# Patient Record
Sex: MALE | NOT HISPANIC OR LATINO | Employment: OTHER | URBAN - METROPOLITAN AREA
[De-identification: names, ages, dates, MRNs, and addresses within clinical notes are randomized per-mention and may not be internally consistent; named-entity substitution may affect disease eponyms.]

---

## 2023-09-26 PROBLEM — R73.9 HYPERGLYCEMIA: Status: ACTIVE | Noted: 2023-09-26

## 2023-09-26 PROBLEM — I25.5 ISCHEMIC CARDIOMYOPATHY: Status: ACTIVE | Noted: 2023-09-26

## 2023-09-26 PROBLEM — I48.19 OTHER PERSISTENT ATRIAL FIBRILLATION (MULTI): Status: ACTIVE | Noted: 2023-09-26

## 2023-09-26 PROBLEM — I48.91 ATRIAL FIBRILLATION (MULTI): Status: ACTIVE | Noted: 2023-09-26

## 2023-09-26 PROBLEM — E78.5 HYPERLIPIDEMIA: Status: ACTIVE | Noted: 2023-09-26

## 2023-09-26 PROBLEM — F17.211 NICOTINE DEPENDENCE, CIGARETTES, IN REMISSION: Status: ACTIVE | Noted: 2023-09-26

## 2023-09-26 PROBLEM — R73.03 PRE-DIABETES: Status: ACTIVE | Noted: 2023-09-26

## 2023-09-26 PROBLEM — E66.812 CLASS 2 OBESITY: Status: ACTIVE | Noted: 2023-09-26

## 2023-09-26 PROBLEM — E55.9 VITAMIN D DEFICIENCY: Status: ACTIVE | Noted: 2023-09-26

## 2023-09-26 PROBLEM — R97.20 ELEVATED PSA: Status: ACTIVE | Noted: 2023-09-26

## 2023-09-26 PROBLEM — G47.33 OSA (OBSTRUCTIVE SLEEP APNEA): Status: ACTIVE | Noted: 2023-09-26

## 2023-09-26 PROBLEM — Z95.1 S/P CABG X 3: Status: ACTIVE | Noted: 2023-09-26

## 2023-09-26 PROBLEM — F17.200 TOBACCO USE DISORDER: Status: ACTIVE | Noted: 2023-09-26

## 2023-09-26 PROBLEM — Z95.5 HISTORY OF CORONARY ARTERY STENT PLACEMENT: Status: ACTIVE | Noted: 2023-09-26

## 2023-09-26 PROBLEM — I48.0 PAROXYSMAL ATRIAL FIBRILLATION (MULTI): Status: ACTIVE | Noted: 2023-09-26

## 2023-09-26 PROBLEM — G47.10 HYPERSOMNIA: Status: ACTIVE | Noted: 2023-09-26

## 2023-09-26 PROBLEM — E66.9 OBESITY: Status: ACTIVE | Noted: 2023-09-26

## 2023-09-26 PROBLEM — I47.29 NSVT (NONSUSTAINED VENTRICULAR TACHYCARDIA) (MULTI): Status: ACTIVE | Noted: 2023-09-26

## 2023-09-26 PROBLEM — R40.0 SOMNOLENCE: Status: ACTIVE | Noted: 2023-09-26

## 2023-09-26 PROBLEM — E66.9 CLASS 2 OBESITY: Status: ACTIVE | Noted: 2023-09-26

## 2023-09-26 PROBLEM — I25.10 CAD (CORONARY ARTERY DISEASE): Status: ACTIVE | Noted: 2023-09-26

## 2023-09-26 PROBLEM — H54.7: Status: ACTIVE | Noted: 2023-09-26

## 2023-09-26 PROBLEM — E78.2 MIXED HYPERLIPIDEMIA: Status: ACTIVE | Noted: 2023-09-26

## 2023-09-26 PROBLEM — I10 BENIGN HYPERTENSION: Status: ACTIVE | Noted: 2023-09-26

## 2023-09-26 RX ORDER — ASPIRIN 81 MG/1
1 TABLET ORAL DAILY
COMMUNITY

## 2023-09-26 RX ORDER — METOPROLOL TARTRATE 25 MG/1
1 TABLET, FILM COATED ORAL 2 TIMES DAILY
COMMUNITY

## 2023-09-26 RX ORDER — PANTOPRAZOLE SODIUM 40 MG/1
1 TABLET, DELAYED RELEASE ORAL DAILY
COMMUNITY
End: 2023-10-09 | Stop reason: ALTCHOICE

## 2023-09-26 RX ORDER — CHOLECALCIFEROL (VITAMIN D3) 25 MCG
1 TABLET ORAL DAILY
COMMUNITY

## 2023-09-26 RX ORDER — MULTIVITAMIN
1 TABLET ORAL DAILY
COMMUNITY
End: 2023-10-09 | Stop reason: ALTCHOICE

## 2023-09-26 RX ORDER — POLYETHYLENE GLYCOL 3350 17 G/17G
17 POWDER, FOR SOLUTION ORAL DAILY PRN
COMMUNITY
Start: 2020-10-22 | End: 2023-10-09 | Stop reason: ALTCHOICE

## 2023-09-26 RX ORDER — DOCUSATE SODIUM 100 MG/1
CAPSULE, LIQUID FILLED ORAL 2 TIMES DAILY PRN
COMMUNITY
Start: 2020-10-22 | End: 2023-10-09 | Stop reason: ALTCHOICE

## 2023-09-26 RX ORDER — AMIODARONE HYDROCHLORIDE 200 MG/1
1 TABLET ORAL DAILY
COMMUNITY
End: 2023-10-09 | Stop reason: ALTCHOICE

## 2023-09-26 RX ORDER — ATORVASTATIN CALCIUM 80 MG/1
1 TABLET, FILM COATED ORAL NIGHTLY
COMMUNITY
End: 2024-04-16

## 2023-09-26 RX ORDER — LISINOPRIL AND HYDROCHLOROTHIAZIDE 10; 12.5 MG/1; MG/1
1 TABLET ORAL DAILY
COMMUNITY

## 2023-09-26 RX ORDER — ACETAMINOPHEN 325 MG/1
650 TABLET ORAL EVERY 6 HOURS PRN
COMMUNITY
Start: 2020-10-22 | End: 2024-04-11 | Stop reason: ALTCHOICE

## 2023-09-26 RX ORDER — CLOPIDOGREL BISULFATE 75 MG/1
75 TABLET ORAL DAILY
COMMUNITY
Start: 2020-10-22 | End: 2023-10-09 | Stop reason: ALTCHOICE

## 2023-10-04 PROBLEM — K21.9 GASTROESOPHAGEAL REFLUX DISEASE WITHOUT ESOPHAGITIS: Status: ACTIVE | Noted: 2023-10-04

## 2023-10-04 NOTE — PROGRESS NOTES
Subjective   Patient ID:   Ramsey Hebert is a 73 y.o. male who presents for No chief complaint on file..  HPI  A-fib/HTN/CAD/NSVT:  Seeing cardiology.  Taking Amiodarone, Plavix, Lisinopril-hydrochlorothiazide, Metoprolol.  BP today is  Denies dizziness, headaches.    HLD:  Taking Atorvastatin.  Last checked April 2022.  DUE for labs.    Pre-diabetes:  Last A1C was 6.4 in April 2022.  Not on medication for this.  DUE for labs.    Elevated PSA:  DUE for labs.    GERD:  Taking Protonix.    Health maintenance:  Smoking:  PSA (50+): DUE  Labs: DUE  Colonoscopy (50-75): DUE  Prevnar 13 (65+):  Pneumovax 23 (65+, 1 year after Prev 13):  Influenza:  Zostavax (60+, 1 time, at pharmacy):    Review of Systems  12 point review of systems negative unless stated above in HPI    There were no vitals filed for this visit.    Physical Exam  General: Alert and oriented, well nourished, no acute distress.  Lungs: Clear to auscultation, non-labored respiration.  Heart: Normal rate, regular rhythm, no murmur, gallop or edema.  Neurologic: Awake, alert, and oriented X3, CN II-XII intact.  Psychiatric: Cooperative, appropriate mood and affect.    Assessment/Plan   Diagnoses and all orders for this visit:  Medicare annual wellness visit, subsequent  Depression screening  Atrial fibrillation, unspecified type (CMS/HCC)  Benign hypertension  Coronary artery disease involving native heart without angina pectoris, unspecified vessel or lesion type  Pure hypercholesterolemia  Paroxysmal atrial fibrillation (CMS/HCC)  Other persistent atrial fibrillation (CMS/HCC)  Pre-diabetes  NSVT (nonsustained ventricular tachycardia) (CMS/HCC)  Gastroesophageal reflux disease without esophagitis  Elevated PSA

## 2023-10-09 ENCOUNTER — OFFICE VISIT (OUTPATIENT)
Dept: PRIMARY CARE | Facility: CLINIC | Age: 73
End: 2023-10-09
Payer: COMMERCIAL

## 2023-10-09 VITALS
BODY MASS INDEX: 44.1 KG/M2 | HEART RATE: 56 BPM | WEIGHT: 281 LBS | HEIGHT: 67 IN | DIASTOLIC BLOOD PRESSURE: 72 MMHG | OXYGEN SATURATION: 98 % | SYSTOLIC BLOOD PRESSURE: 146 MMHG

## 2023-10-09 DIAGNOSIS — I48.0 PAROXYSMAL ATRIAL FIBRILLATION (MULTI): ICD-10-CM

## 2023-10-09 DIAGNOSIS — Z13.220 ENCOUNTER FOR LIPID SCREENING FOR CARDIOVASCULAR DISEASE: ICD-10-CM

## 2023-10-09 DIAGNOSIS — E78.00 PURE HYPERCHOLESTEROLEMIA: ICD-10-CM

## 2023-10-09 DIAGNOSIS — R97.20 ELEVATED PSA: ICD-10-CM

## 2023-10-09 DIAGNOSIS — I25.10 CORONARY ARTERY DISEASE INVOLVING NATIVE HEART WITHOUT ANGINA PECTORIS, UNSPECIFIED VESSEL OR LESION TYPE: ICD-10-CM

## 2023-10-09 DIAGNOSIS — Z00.00 MEDICARE ANNUAL WELLNESS VISIT, SUBSEQUENT: Primary | ICD-10-CM

## 2023-10-09 DIAGNOSIS — Z00.00 ROUTINE GENERAL MEDICAL EXAMINATION AT HEALTH CARE FACILITY: ICD-10-CM

## 2023-10-09 DIAGNOSIS — K21.9 GASTROESOPHAGEAL REFLUX DISEASE WITHOUT ESOPHAGITIS: ICD-10-CM

## 2023-10-09 DIAGNOSIS — Z13.31 DEPRESSION SCREENING: ICD-10-CM

## 2023-10-09 DIAGNOSIS — I10 BENIGN HYPERTENSION: ICD-10-CM

## 2023-10-09 DIAGNOSIS — I48.19 OTHER PERSISTENT ATRIAL FIBRILLATION (MULTI): ICD-10-CM

## 2023-10-09 DIAGNOSIS — I48.91 ATRIAL FIBRILLATION, UNSPECIFIED TYPE (MULTI): ICD-10-CM

## 2023-10-09 DIAGNOSIS — I47.29 NSVT (NONSUSTAINED VENTRICULAR TACHYCARDIA) (MULTI): ICD-10-CM

## 2023-10-09 DIAGNOSIS — M79.642 PAIN IN BOTH HANDS: ICD-10-CM

## 2023-10-09 DIAGNOSIS — M79.641 PAIN IN BOTH HANDS: ICD-10-CM

## 2023-10-09 DIAGNOSIS — R73.03 PRE-DIABETES: ICD-10-CM

## 2023-10-09 DIAGNOSIS — E66.01 OBESITY, MORBID (MULTI): ICD-10-CM

## 2023-10-09 DIAGNOSIS — Z13.6 ENCOUNTER FOR LIPID SCREENING FOR CARDIOVASCULAR DISEASE: ICD-10-CM

## 2023-10-09 PROCEDURE — 3078F DIAST BP <80 MM HG: CPT | Performed by: PHYSICIAN ASSISTANT

## 2023-10-09 PROCEDURE — 1159F MED LIST DOCD IN RCRD: CPT | Performed by: PHYSICIAN ASSISTANT

## 2023-10-09 PROCEDURE — 3077F SYST BP >= 140 MM HG: CPT | Performed by: PHYSICIAN ASSISTANT

## 2023-10-09 PROCEDURE — 99213 OFFICE O/P EST LOW 20 MIN: CPT | Performed by: PHYSICIAN ASSISTANT

## 2023-10-09 PROCEDURE — 4004F PT TOBACCO SCREEN RCVD TLK: CPT | Performed by: PHYSICIAN ASSISTANT

## 2023-10-09 PROCEDURE — 1170F FXNL STATUS ASSESSED: CPT | Performed by: PHYSICIAN ASSISTANT

## 2023-10-09 PROCEDURE — G0439 PPPS, SUBSEQ VISIT: HCPCS | Performed by: PHYSICIAN ASSISTANT

## 2023-10-09 PROCEDURE — G0444 DEPRESSION SCREEN ANNUAL: HCPCS | Performed by: PHYSICIAN ASSISTANT

## 2023-10-09 PROCEDURE — 1126F AMNT PAIN NOTED NONE PRSNT: CPT | Performed by: PHYSICIAN ASSISTANT

## 2023-10-09 PROCEDURE — 1160F RVW MEDS BY RX/DR IN RCRD: CPT | Performed by: PHYSICIAN ASSISTANT

## 2023-10-09 RX ORDER — LISINOPRIL 10 MG/1
10 TABLET ORAL DAILY
COMMUNITY
Start: 2016-02-07 | End: 2023-10-09 | Stop reason: ALTCHOICE

## 2023-10-09 RX ORDER — CHLORTHALIDONE 25 MG/1
1 TABLET ORAL DAILY
COMMUNITY
Start: 2015-05-13 | End: 2023-10-09 | Stop reason: ALTCHOICE

## 2023-10-09 RX ORDER — DICLOFENAC SODIUM 10 MG/G
4 GEL TOPICAL 3 TIMES DAILY PRN
Qty: 100 G | Refills: 1 | Status: SHIPPED | OUTPATIENT
Start: 2023-10-09

## 2023-10-09 ASSESSMENT — ACTIVITIES OF DAILY LIVING (ADL)
GROCERY_SHOPPING: INDEPENDENT
TAKING_MEDICATION: INDEPENDENT
MANAGING_FINANCES: INDEPENDENT
DRESSING: INDEPENDENT
DOING_HOUSEWORK: INDEPENDENT
BATHING: INDEPENDENT

## 2023-10-09 ASSESSMENT — PATIENT HEALTH QUESTIONNAIRE - PHQ9
2. FEELING DOWN, DEPRESSED OR HOPELESS: NOT AT ALL
SUM OF ALL RESPONSES TO PHQ9 QUESTIONS 1 AND 2: 0
1. LITTLE INTEREST OR PLEASURE IN DOING THINGS: NOT AT ALL

## 2023-10-09 ASSESSMENT — LIFESTYLE VARIABLES
HOW OFTEN DO YOU HAVE SIX OR MORE DRINKS ON ONE OCCASION: NEVER
HOW OFTEN DO YOU HAVE A DRINK CONTAINING ALCOHOL: NEVER
SKIP TO QUESTIONS 9-10: 1
HOW MANY STANDARD DRINKS CONTAINING ALCOHOL DO YOU HAVE ON A TYPICAL DAY: PATIENT DOES NOT DRINK
AUDIT-C TOTAL SCORE: 0

## 2023-10-09 ASSESSMENT — ENCOUNTER SYMPTOMS
LOSS OF SENSATION IN FEET: 0
OCCASIONAL FEELINGS OF UNSTEADINESS: 0
DEPRESSION: 0

## 2023-10-09 ASSESSMENT — PAIN SCALES - GENERAL: PAINLEVEL: 0-NO PAIN

## 2023-10-09 NOTE — PROGRESS NOTES
"Subjective   Reason for Visit: Ramsey Hebert is an 73 y.o. male here for a Medicare Wellness visit.     Past Medical, Surgical, and Family History reviewed and updated in chart.    Reviewed all medications by prescribing practitioner or clinical pharmacist (such as prescriptions, OTCs, herbal therapies and supplements) and documented in the medical record.    [facility]    Patient Care Team:  Pradip Alvarado PA-C as PCP - General (Family Medicine)     A-fib/HTN/CAD/NSVT:  Seeing cardiology.  Taking Amiodarone, Plavix, Lisinopril-hydrochlorothiazide, Metoprolol.  BP today is 146/72.  Denies dizziness, headaches.    HLD:  Taking Atorvastatin.  Last checked April 2022.  DUE for labs.    Pre-diabetes:  Last A1C was 6.4 in April 2022.  Not on medication for this.  DUE for labs.    Elevated PSA:  DUE for labs.    GERD:  Had been on Protonix in the past.  Doing well.    Hand pain:  Both hands.  Worse in his right hand.  Worse with activity.    Health maintenance:  Smoking: Current smoker.  PSA (50+): DUE  Labs: DUE  Colonoscopy (50-75): DUE  Prevnar 13 (65+):  Pneumovax 23 (65+, 1 year after Prev 13):  Influenza:  Zostavax (60+, 1 time, at pharmacy):    Review of Systems  12 point review of systems negative unless stated above in HPI    Objective   Vitals:  /72   Pulse 56   Ht 1.702 m (5' 7\")   Wt 127 kg (281 lb)   SpO2 98%   BMI 44.01 kg/m²       Physical Exam  General: Alert and oriented, well nourished, no acute distress.  Lungs: Clear to auscultation, non-labored respiration.  Heart: Normal rate, regular rhythm, no murmur, gallop or edema.  Neurologic: Awake, alert, and oriented X3, CN II-XII intact.  Psychiatric: Cooperative, appropriate mood and affect.    Assessment/Plan   This counts as your annual Medicare Wellness visit.  Health maintenance was reviewed today.  Depression screening is negative.  I have ordered some labs to be done as soon as you can.  We will call you with the results.  I have sent in " Voltaren gel for the hand pains.  Consider ortho.  Continue specialty care.  Continue the same medications.  Chronic conditions are stable.  Call with questions or concerns.    Follow up  6 months  Problem List Items Addressed This Visit          Cardiac and Vasculature    CAD (coronary artery disease)    Benign hypertension    Hyperlipidemia    NSVT (nonsustained ventricular tachycardia) (CMS/[facility])    Atrial fibrillation (CMS/[facility])    Relevant Orders    Comprehensive Metabolic Panel    Lipid Panel    CBC and Auto Differential    Other persistent atrial fibrillation (CMS/HCC)    Paroxysmal atrial fibrillation (CMS/[facility])       Endocrine/Metabolic    Obesity, morbid (CMS/[facility])    Pre-diabetes    Relevant Orders    Hemoglobin A1C       Gastrointestinal and Abdominal    Gastroesophageal reflux disease without esophagitis       Genitourinary and Reproductive    Elevated PSA    Relevant Orders    Prostate Specific Antigen, Screen       Musculoskeletal and Injuries    Pain in both hands    Relevant Medications    diclofenac sodium (Voltaren) 1 % gel gel     Other Visit Diagnoses       Medicare annual wellness visit, subsequent    -  Primary    Depression screening        Encounter for lipid screening for cardiovascular disease        Relevant Orders    Lipid Panel    Routine general medical examination at health care facility

## 2024-04-01 NOTE — PROGRESS NOTES
Subjective   Reason for Visit: Ramsey Hebert is an 73 y.o. male here for a follow up    HPI    Patient Care Team:  rPadip Alvarado PA-C as PCP - General (Internal Medicine)     A-fib/HTN/CAD/NSVT:  Seeing cardiology.  Taking Amiodarone, Plavix, Lisinopril-hydrochlorothiazide, Metoprolol.  BP today is 124/82.  Denies dizziness, headaches.    HLD:  Taking Atorvastatin.  Last checked April 2024.    Diabetes:  New onset in April 2024 with A1C of 7.2.  I recommended Metformin.    Elevated PSA:  Last checked April 2024 and was improving.    GERD:  Had been on Protonix in the past.  Doing well.    Hand pain:  I gave Voltaren gel last visit.  Consider ortho.  Both hands.  Worse in his right hand.  Worse with activity.    Leg cramping:  Happening off and on.  Both legs.  Only while walking.  Does not take any magnesium supplements.    Health maintenance:  Smoking: Current smoker.  PSA (50+): April 2024.  Labs: April 2024.  Prevnar 13 (65+):  Pneumovax 23 (65+, 1 year after Prev 13):  Influenza:  Zostavax (60+, 1 time, at pharmacy):    Review of Systems  12 point review of systems negative unless stated above in HPI    Objective   Vitals:  /82   Pulse 67   Wt 123 kg (270 lb 9.6 oz)   SpO2 97%   BMI 42.38 kg/m²       Physical Exam  General: Alert and oriented, well nourished, no acute distress.  Lungs: Clear to auscultation, non-labored respiration.  Heart: Normal rate, regular rhythm, no murmur, gallop or edema.  Neurologic: Awake, alert, and oriented X3, CN II-XII intact.  Psychiatric: Cooperative, appropriate mood and affect.    Assessment/Plan   It was good seeing you!  We discussed your recent lab results today.  I have sent in Metformin to begin for diabetes.  I would also recommend an over the counter magnesium supplement for the leg cramping.  Consider a muscle relaxer.  Continue specialty care.  Continue the same medications.  Chronic conditions are stable.  Call with questions or concerns.    Follow up  3  months for A1C  Problem List Items Addressed This Visit          Cardiac and Vasculature    CAD (coronary artery disease)    HTN (hypertension)    Hyperlipidemia    NSVT (nonsustained ventricular tachycardia) (CMS/[facility])    Current Assessment & Plan     Stable - seeing cardiology         Atrial fibrillation (CMS/HCC) - Primary    Current Assessment & Plan     Stable - seeing cardiology         Other persistent atrial fibrillation (CMS/HCC)    Current Assessment & Plan     Stable - seeing cardiology         Paroxysmal atrial fibrillation (CMS/HCC)    Current Assessment & Plan     Stable - seeing cardiology            Endocrine/Metabolic    Morbid obesity (CMS/HCC)    Current Assessment & Plan     Working on diet and exercise         Type 2 diabetes mellitus without complication, without long-term current use of insulin (CMS/HCC)    Current Assessment & Plan     New onset in April 2024. Started Metformin at that time.         Relevant Medications    metFORMIN (Glucophage) 500 mg tablet       Gastrointestinal and Abdominal    Gastroesophageal reflux disease without esophagitis       Musculoskeletal and Injuries    Pain in both hands       Symptoms and Signs    Leg cramping     Other Visit Diagnoses       Screening PSA (prostate specific antigen)

## 2024-04-04 ENCOUNTER — LAB (OUTPATIENT)
Dept: LAB | Facility: LAB | Age: 74
End: 2024-04-04
Payer: MEDICARE

## 2024-04-04 DIAGNOSIS — R73.03 PRE-DIABETES: ICD-10-CM

## 2024-04-04 DIAGNOSIS — E11.9 TYPE 2 DIABETES MELLITUS WITHOUT COMPLICATION, WITHOUT LONG-TERM CURRENT USE OF INSULIN (MULTI): Primary | ICD-10-CM

## 2024-04-04 DIAGNOSIS — R97.20 ELEVATED PSA: ICD-10-CM

## 2024-04-04 DIAGNOSIS — I48.91 ATRIAL FIBRILLATION, UNSPECIFIED TYPE (MULTI): ICD-10-CM

## 2024-04-04 DIAGNOSIS — Z13.6 ENCOUNTER FOR LIPID SCREENING FOR CARDIOVASCULAR DISEASE: ICD-10-CM

## 2024-04-04 DIAGNOSIS — Z13.220 ENCOUNTER FOR LIPID SCREENING FOR CARDIOVASCULAR DISEASE: ICD-10-CM

## 2024-04-04 LAB
ALBUMIN SERPL-MCNC: 3.9 G/DL (ref 3.5–5)
ALP BLD-CCNC: 93 U/L (ref 35–125)
ALT SERPL-CCNC: 20 U/L (ref 5–40)
ANION GAP SERPL CALC-SCNC: 14 MMOL/L
AST SERPL-CCNC: 15 U/L (ref 5–40)
BASOPHILS # BLD AUTO: 0.05 X10*3/UL (ref 0–0.1)
BASOPHILS NFR BLD AUTO: 0.6 %
BILIRUB SERPL-MCNC: 0.7 MG/DL (ref 0.1–1.2)
BUN SERPL-MCNC: 26 MG/DL (ref 8–25)
CALCIUM SERPL-MCNC: 9.2 MG/DL (ref 8.5–10.4)
CHLORIDE SERPL-SCNC: 105 MMOL/L (ref 97–107)
CHOLEST SERPL-MCNC: 127 MG/DL (ref 133–200)
CHOLEST/HDLC SERPL: 5.8 {RATIO}
CO2 SERPL-SCNC: 22 MMOL/L (ref 24–31)
CREAT SERPL-MCNC: 1.5 MG/DL (ref 0.4–1.6)
EGFRCR SERPLBLD CKD-EPI 2021: 49 ML/MIN/1.73M*2
EOSINOPHIL # BLD AUTO: 0.24 X10*3/UL (ref 0–0.4)
EOSINOPHIL NFR BLD AUTO: 2.8 %
ERYTHROCYTE [DISTWIDTH] IN BLOOD BY AUTOMATED COUNT: 14.4 % (ref 11.5–14.5)
EST. AVERAGE GLUCOSE BLD GHB EST-MCNC: 160 MG/DL
GLUCOSE SERPL-MCNC: 142 MG/DL (ref 65–99)
HBA1C MFR BLD: 7.2 %
HCT VFR BLD AUTO: 44.2 % (ref 41–52)
HDLC SERPL-MCNC: 22 MG/DL
HGB BLD-MCNC: 14.2 G/DL (ref 13.5–17.5)
IMM GRANULOCYTES # BLD AUTO: 0.04 X10*3/UL (ref 0–0.5)
IMM GRANULOCYTES NFR BLD AUTO: 0.5 % (ref 0–0.9)
LDLC SERPL CALC-MCNC: 61 MG/DL (ref 65–130)
LYMPHOCYTES # BLD AUTO: 2.25 X10*3/UL (ref 0.8–3)
LYMPHOCYTES NFR BLD AUTO: 26.7 %
MCH RBC QN AUTO: 30.7 PG (ref 26–34)
MCHC RBC AUTO-ENTMCNC: 32.1 G/DL (ref 32–36)
MCV RBC AUTO: 96 FL (ref 80–100)
MONOCYTES # BLD AUTO: 0.65 X10*3/UL (ref 0.05–0.8)
MONOCYTES NFR BLD AUTO: 7.7 %
NEUTROPHILS # BLD AUTO: 5.21 X10*3/UL (ref 1.6–5.5)
NEUTROPHILS NFR BLD AUTO: 61.7 %
NRBC BLD-RTO: 0 /100 WBCS (ref 0–0)
PLATELET # BLD AUTO: 188 X10*3/UL (ref 150–450)
POTASSIUM SERPL-SCNC: 4.4 MMOL/L (ref 3.4–5.1)
PROT SERPL-MCNC: 6.4 G/DL (ref 5.9–7.9)
PSA SERPL-MCNC: 4.1 NG/ML
RBC # BLD AUTO: 4.62 X10*6/UL (ref 4.5–5.9)
SODIUM SERPL-SCNC: 141 MMOL/L (ref 133–145)
TRIGL SERPL-MCNC: 218 MG/DL (ref 40–150)
WBC # BLD AUTO: 8.4 X10*3/UL (ref 4.4–11.3)

## 2024-04-04 PROCEDURE — 83036 HEMOGLOBIN GLYCOSYLATED A1C: CPT

## 2024-04-04 PROCEDURE — 36415 COLL VENOUS BLD VENIPUNCTURE: CPT

## 2024-04-04 PROCEDURE — 85025 COMPLETE CBC W/AUTO DIFF WBC: CPT

## 2024-04-04 PROCEDURE — 84153 ASSAY OF PSA TOTAL: CPT

## 2024-04-04 PROCEDURE — 80061 LIPID PANEL: CPT

## 2024-04-04 PROCEDURE — 80053 COMPREHEN METABOLIC PANEL: CPT

## 2024-04-04 NOTE — RESULT ENCOUNTER NOTE
New onset diabetes with A1C of 7.2 - I would like to start Metformin 500mg BID if agreeable. We can discuss this more at his visit next week too. Waiting on PSA

## 2024-04-09 ENCOUNTER — TELEPHONE (OUTPATIENT)
Dept: PRIMARY CARE | Facility: CLINIC | Age: 74
End: 2024-04-09
Payer: MEDICARE

## 2024-04-09 RX ORDER — METFORMIN HYDROCHLORIDE 500 MG/1
500 TABLET ORAL
Qty: 90 TABLET | Refills: 1 | Status: SHIPPED | OUTPATIENT
Start: 2024-04-09 | End: 2024-04-11 | Stop reason: WASHOUT

## 2024-04-10 ENCOUNTER — APPOINTMENT (OUTPATIENT)
Dept: PRIMARY CARE | Facility: CLINIC | Age: 74
End: 2024-04-10
Payer: COMMERCIAL

## 2024-04-11 ENCOUNTER — OFFICE VISIT (OUTPATIENT)
Dept: PRIMARY CARE | Facility: CLINIC | Age: 74
End: 2024-04-11
Payer: MEDICARE

## 2024-04-11 VITALS
OXYGEN SATURATION: 97 % | HEART RATE: 67 BPM | WEIGHT: 270.6 LBS | BODY MASS INDEX: 42.38 KG/M2 | SYSTOLIC BLOOD PRESSURE: 124 MMHG | DIASTOLIC BLOOD PRESSURE: 82 MMHG

## 2024-04-11 DIAGNOSIS — I48.91 ATRIAL FIBRILLATION, UNSPECIFIED TYPE (MULTI): Primary | ICD-10-CM

## 2024-04-11 DIAGNOSIS — I48.0 PAROXYSMAL ATRIAL FIBRILLATION (MULTI): ICD-10-CM

## 2024-04-11 DIAGNOSIS — R25.2 LEG CRAMPING: ICD-10-CM

## 2024-04-11 DIAGNOSIS — I10 BENIGN HYPERTENSION: ICD-10-CM

## 2024-04-11 DIAGNOSIS — K21.9 GASTROESOPHAGEAL REFLUX DISEASE WITHOUT ESOPHAGITIS: ICD-10-CM

## 2024-04-11 DIAGNOSIS — E66.01 OBESITY, MORBID (MULTI): ICD-10-CM

## 2024-04-11 DIAGNOSIS — I48.19 OTHER PERSISTENT ATRIAL FIBRILLATION (MULTI): ICD-10-CM

## 2024-04-11 DIAGNOSIS — I25.10 CORONARY ARTERY DISEASE INVOLVING NATIVE HEART WITHOUT ANGINA PECTORIS, UNSPECIFIED VESSEL OR LESION TYPE: ICD-10-CM

## 2024-04-11 DIAGNOSIS — Z12.5 SCREENING PSA (PROSTATE SPECIFIC ANTIGEN): ICD-10-CM

## 2024-04-11 DIAGNOSIS — I47.29 NSVT (NONSUSTAINED VENTRICULAR TACHYCARDIA) (MULTI): ICD-10-CM

## 2024-04-11 DIAGNOSIS — E78.00 PURE HYPERCHOLESTEROLEMIA: ICD-10-CM

## 2024-04-11 DIAGNOSIS — E11.9 TYPE 2 DIABETES MELLITUS WITHOUT COMPLICATION, WITHOUT LONG-TERM CURRENT USE OF INSULIN (MULTI): ICD-10-CM

## 2024-04-11 DIAGNOSIS — M79.642 PAIN IN BOTH HANDS: ICD-10-CM

## 2024-04-11 DIAGNOSIS — M79.641 PAIN IN BOTH HANDS: ICD-10-CM

## 2024-04-11 PROBLEM — Z78.9 MEDICAL HOME PATIENT: Status: ACTIVE | Noted: 2024-04-11

## 2024-04-11 PROBLEM — M47.26 OSTEOARTHRITIS OF SPINE WITH RADICULOPATHY, LUMBAR REGION: Status: ACTIVE | Noted: 2018-04-13

## 2024-04-11 PROCEDURE — 3079F DIAST BP 80-89 MM HG: CPT | Performed by: PHYSICIAN ASSISTANT

## 2024-04-11 PROCEDURE — 3074F SYST BP LT 130 MM HG: CPT | Performed by: PHYSICIAN ASSISTANT

## 2024-04-11 PROCEDURE — 1157F ADVNC CARE PLAN IN RCRD: CPT | Performed by: PHYSICIAN ASSISTANT

## 2024-04-11 PROCEDURE — 1159F MED LIST DOCD IN RCRD: CPT | Performed by: PHYSICIAN ASSISTANT

## 2024-04-11 PROCEDURE — 3051F HG A1C>EQUAL 7.0%<8.0%: CPT | Performed by: PHYSICIAN ASSISTANT

## 2024-04-11 PROCEDURE — 99214 OFFICE O/P EST MOD 30 MIN: CPT | Performed by: PHYSICIAN ASSISTANT

## 2024-04-11 PROCEDURE — 3048F LDL-C <100 MG/DL: CPT | Performed by: PHYSICIAN ASSISTANT

## 2024-04-11 PROCEDURE — 1126F AMNT PAIN NOTED NONE PRSNT: CPT | Performed by: PHYSICIAN ASSISTANT

## 2024-04-11 PROCEDURE — 1160F RVW MEDS BY RX/DR IN RCRD: CPT | Performed by: PHYSICIAN ASSISTANT

## 2024-04-11 RX ORDER — METFORMIN HYDROCHLORIDE 500 MG/1
500 TABLET ORAL
Qty: 180 TABLET | Refills: 0 | Status: SHIPPED | OUTPATIENT
Start: 2024-04-11 | End: 2024-07-10

## 2024-04-11 ASSESSMENT — LIFESTYLE VARIABLES
SKIP TO QUESTIONS 9-10: 1
HOW OFTEN DO YOU HAVE A DRINK CONTAINING ALCOHOL: NEVER
HOW MANY STANDARD DRINKS CONTAINING ALCOHOL DO YOU HAVE ON A TYPICAL DAY: PATIENT DOES NOT DRINK
HOW OFTEN DO YOU HAVE SIX OR MORE DRINKS ON ONE OCCASION: NEVER
AUDIT-C TOTAL SCORE: 0

## 2024-04-11 ASSESSMENT — ENCOUNTER SYMPTOMS
OCCASIONAL FEELINGS OF UNSTEADINESS: 0
DEPRESSION: 0

## 2024-04-11 ASSESSMENT — PATIENT HEALTH QUESTIONNAIRE - PHQ9
SUM OF ALL RESPONSES TO PHQ9 QUESTIONS 1 AND 2: 0
2. FEELING DOWN, DEPRESSED OR HOPELESS: NOT AT ALL
1. LITTLE INTEREST OR PLEASURE IN DOING THINGS: NOT AT ALL

## 2024-04-11 ASSESSMENT — PAIN SCALES - GENERAL: PAINLEVEL: 0-NO PAIN

## 2024-04-15 DIAGNOSIS — E78.5 HYPERLIPIDEMIA, UNSPECIFIED: ICD-10-CM

## 2024-04-16 RX ORDER — ATORVASTATIN CALCIUM 80 MG/1
80 TABLET, FILM COATED ORAL DAILY
Qty: 90 TABLET | Refills: 2 | Status: SHIPPED | OUTPATIENT
Start: 2024-04-16

## 2024-07-01 NOTE — PROGRESS NOTES
"Subjective   Reason for Visit: Ramsey Hebert is an 73 y.o. male here for a follow up    HPI  Patient Care Team:  Pradip Alvarado PA-C as PCP - General (Internal Medicine)  Zulma Ward MD as PCP - [employer] PCP     A-fib/HTN/CAD/NSVT:  Seeing cardiology.  Taking Amiodarone, Plavix, Lisinopril-hydrochlorothiazide, Metoprolol.  BP today is 126/84.  Denies dizziness, headaches.    Bunion:  Right foot.  Symptoms x years.  Bothersome.  Does not see podiatry.    HLD:  Taking Atorvastatin.  Last checked April 2024.    Diabetes:  New onset in April 2024 with A1C of 7.2.  We started Metformin last visit.  POC today is 6.1.  Checking sugars at home and getting low 100s.    Elevated PSA:  Last checked April 2024 and was improving.    GERD:  Had been on Protonix in the past.  Doing well.    Hand pain:  I gave Voltaren gel last visit - this did not help much.  Consider ortho.  Both hands.  Worse in his right hand.  Worse with activity.    Leg cramping:  I recommended magnesium last visit.  Consider a muscle relaxer.  Happening off and on.  Both legs.  Only while walking.    Health maintenance:  Smoking: Current smoker.  PSA (50+): April 2024.  Labs: April 2024.  Prevnar 13 (65+): 2013  Pneumovax 23 (65+, 1 year after Prev 13): 2016  Influenza:  Zostavax (60+, 1 time, at pharmacy):    Review of Systems  12 point review of systems negative unless stated above in HPI    Objective   Vitals:  /84   Pulse 62   Ht 1.727 m (5' 8\")   Wt 119 kg (262 lb)   SpO2 95%   BMI 39.84 kg/m²       Physical Exam  General: Alert and oriented, well nourished, no acute distress.  Lungs: Clear to auscultation, non-labored respiration.  Heart: Normal rate, regular rhythm, no murmur, gallop or edema.  Neurologic: Awake, alert, and oriented X3, CN II-XII intact.  Psychiatric: Cooperative, appropriate mood and affect.    Assessment/Plan   It was good seeing you!  Your A1C is improving!  I have placed a referral to podiatry for " further management.  Continue working on diet and exercise.  Continue specialty care.  Continue the same medications.  Chronic conditions are stable.  Call with questions or concerns.    Follow up  4 months for A1C  Problem List Items Addressed This Visit          Cardiac and Vasculature    CAD (coronary artery disease)    Hyperlipidemia    NSVT (nonsustained ventricular tachycardia) (Multi)    Atrial fibrillation (Multi)       Endocrine/Metabolic    Type 2 diabetes mellitus without complication, without long-term current use of insulin (Multi) - Primary    Relevant Orders    POCT glycosylated hemoglobin (Hb A1C) manually resulted (Completed)       Gastrointestinal and Abdominal    Gastroesophageal reflux disease without esophagitis       Genitourinary and Reproductive    Elevated PSA       Musculoskeletal and Injuries    Pain in both hands    Bunion of right foot    Relevant Orders    Referral to Podiatry       Symptoms and Signs    Leg cramping     Other Visit Diagnoses       Benign hypertension        Obesity, morbid (Multi)

## 2024-07-09 DIAGNOSIS — E11.9 TYPE 2 DIABETES MELLITUS WITHOUT COMPLICATION, WITHOUT LONG-TERM CURRENT USE OF INSULIN (MULTI): ICD-10-CM

## 2024-07-09 RX ORDER — METFORMIN HYDROCHLORIDE 500 MG/1
500 TABLET ORAL
Qty: 180 TABLET | Refills: 3 | Status: SHIPPED | OUTPATIENT
Start: 2024-07-09 | End: 2025-07-04

## 2024-07-10 ENCOUNTER — OFFICE VISIT (OUTPATIENT)
Dept: PRIMARY CARE | Facility: CLINIC | Age: 74
End: 2024-07-10
Payer: MEDICARE

## 2024-07-10 VITALS
HEART RATE: 62 BPM | DIASTOLIC BLOOD PRESSURE: 84 MMHG | HEIGHT: 68 IN | OXYGEN SATURATION: 95 % | WEIGHT: 262 LBS | SYSTOLIC BLOOD PRESSURE: 126 MMHG | BODY MASS INDEX: 39.71 KG/M2

## 2024-07-10 DIAGNOSIS — E11.9 TYPE 2 DIABETES MELLITUS WITHOUT COMPLICATION, WITHOUT LONG-TERM CURRENT USE OF INSULIN (MULTI): Primary | ICD-10-CM

## 2024-07-10 DIAGNOSIS — I48.91 ATRIAL FIBRILLATION, UNSPECIFIED TYPE (MULTI): ICD-10-CM

## 2024-07-10 DIAGNOSIS — E66.01 OBESITY, MORBID (MULTI): ICD-10-CM

## 2024-07-10 DIAGNOSIS — M79.642 PAIN IN BOTH HANDS: ICD-10-CM

## 2024-07-10 DIAGNOSIS — M79.641 PAIN IN BOTH HANDS: ICD-10-CM

## 2024-07-10 DIAGNOSIS — M21.611 BUNION OF RIGHT FOOT: ICD-10-CM

## 2024-07-10 DIAGNOSIS — I10 BENIGN HYPERTENSION: ICD-10-CM

## 2024-07-10 DIAGNOSIS — I25.10 CORONARY ARTERY DISEASE INVOLVING NATIVE HEART WITHOUT ANGINA PECTORIS, UNSPECIFIED VESSEL OR LESION TYPE: ICD-10-CM

## 2024-07-10 DIAGNOSIS — I47.29 NSVT (NONSUSTAINED VENTRICULAR TACHYCARDIA) (MULTI): ICD-10-CM

## 2024-07-10 DIAGNOSIS — R25.2 LEG CRAMPING: ICD-10-CM

## 2024-07-10 DIAGNOSIS — R97.20 ELEVATED PSA: ICD-10-CM

## 2024-07-10 DIAGNOSIS — E78.00 PURE HYPERCHOLESTEROLEMIA: ICD-10-CM

## 2024-07-10 DIAGNOSIS — K21.9 GASTROESOPHAGEAL REFLUX DISEASE WITHOUT ESOPHAGITIS: ICD-10-CM

## 2024-07-10 LAB — POC HEMOGLOBIN A1C: 6.1 % (ref 4.2–6.5)

## 2024-07-10 PROCEDURE — 99213 OFFICE O/P EST LOW 20 MIN: CPT | Performed by: PHYSICIAN ASSISTANT

## 2024-07-10 PROCEDURE — 1160F RVW MEDS BY RX/DR IN RCRD: CPT | Performed by: PHYSICIAN ASSISTANT

## 2024-07-10 PROCEDURE — 1126F AMNT PAIN NOTED NONE PRSNT: CPT | Performed by: PHYSICIAN ASSISTANT

## 2024-07-10 PROCEDURE — 3051F HG A1C>EQUAL 7.0%<8.0%: CPT | Performed by: PHYSICIAN ASSISTANT

## 2024-07-10 PROCEDURE — 83036 HEMOGLOBIN GLYCOSYLATED A1C: CPT | Mod: QW | Performed by: PHYSICIAN ASSISTANT

## 2024-07-10 PROCEDURE — 1159F MED LIST DOCD IN RCRD: CPT | Performed by: PHYSICIAN ASSISTANT

## 2024-07-10 PROCEDURE — 3048F LDL-C <100 MG/DL: CPT | Performed by: PHYSICIAN ASSISTANT

## 2024-07-10 PROCEDURE — 3079F DIAST BP 80-89 MM HG: CPT | Performed by: PHYSICIAN ASSISTANT

## 2024-07-10 PROCEDURE — 1157F ADVNC CARE PLAN IN RCRD: CPT | Performed by: PHYSICIAN ASSISTANT

## 2024-07-10 PROCEDURE — 3074F SYST BP LT 130 MM HG: CPT | Performed by: PHYSICIAN ASSISTANT

## 2024-07-10 ASSESSMENT — PAIN SCALES - GENERAL: PAINLEVEL: 0-NO PAIN

## 2024-07-10 ASSESSMENT — ENCOUNTER SYMPTOMS
DEPRESSION: 0
OCCASIONAL FEELINGS OF UNSTEADINESS: 0
LOSS OF SENSATION IN FEET: 0

## 2024-07-10 ASSESSMENT — PATIENT HEALTH QUESTIONNAIRE - PHQ9
2. FEELING DOWN, DEPRESSED OR HOPELESS: NOT AT ALL
1. LITTLE INTEREST OR PLEASURE IN DOING THINGS: NOT AT ALL
SUM OF ALL RESPONSES TO PHQ9 QUESTIONS 1 AND 2: 0

## 2024-07-13 ENCOUNTER — APPOINTMENT (OUTPATIENT)
Dept: RADIOLOGY | Facility: HOSPITAL | Age: 74
End: 2024-07-13
Payer: MEDICARE

## 2024-07-13 ENCOUNTER — HOSPITAL ENCOUNTER (EMERGENCY)
Facility: HOSPITAL | Age: 74
Discharge: HOME | End: 2024-07-14
Payer: MEDICARE

## 2024-07-13 ENCOUNTER — HOSPITAL ENCOUNTER (EMERGENCY)
Facility: HOSPITAL | Age: 74
Discharge: OTHER INSTITUTION | End: 2024-07-13
Payer: MEDICARE

## 2024-07-13 VITALS
DIASTOLIC BLOOD PRESSURE: 82 MMHG | BODY MASS INDEX: 39.4 KG/M2 | RESPIRATION RATE: 18 BRPM | HEIGHT: 68 IN | TEMPERATURE: 97.7 F | WEIGHT: 260 LBS | SYSTOLIC BLOOD PRESSURE: 174 MMHG | HEART RATE: 76 BPM | OXYGEN SATURATION: 98 %

## 2024-07-13 DIAGNOSIS — H11.32 CONJUNCTIVAL HEMORRHAGE OF LEFT EYE: ICD-10-CM

## 2024-07-13 DIAGNOSIS — H20.9 TRAUMATIC IRITIS: Primary | ICD-10-CM

## 2024-07-13 DIAGNOSIS — S05.8X2A: Primary | ICD-10-CM

## 2024-07-13 PROCEDURE — 99284 EMERGENCY DEPT VISIT MOD MDM: CPT | Mod: 25

## 2024-07-13 PROCEDURE — 76377 3D RENDER W/INTRP POSTPROCES: CPT | Performed by: RADIOLOGY

## 2024-07-13 PROCEDURE — 99284 EMERGENCY DEPT VISIT MOD MDM: CPT

## 2024-07-13 PROCEDURE — 70486 CT MAXILLOFACIAL W/O DYE: CPT | Performed by: RADIOLOGY

## 2024-07-13 PROCEDURE — 76377 3D RENDER W/INTRP POSTPROCES: CPT

## 2024-07-13 PROCEDURE — 2500000001 HC RX 250 WO HCPCS SELF ADMINISTERED DRUGS (ALT 637 FOR MEDICARE OP): Performed by: PHYSICIAN ASSISTANT

## 2024-07-13 PROCEDURE — 70486 CT MAXILLOFACIAL W/O DYE: CPT

## 2024-07-13 RX ORDER — TETRACAINE HYDROCHLORIDE 5 MG/ML
1 SOLUTION OPHTHALMIC ONCE
Status: COMPLETED | OUTPATIENT
Start: 2024-07-13 | End: 2024-07-13

## 2024-07-13 RX ORDER — ACETAMINOPHEN 325 MG/1
975 TABLET ORAL ONCE
Status: COMPLETED | OUTPATIENT
Start: 2024-07-13 | End: 2024-07-13

## 2024-07-13 RX ADMIN — TETRACAINE HYDROCHLORIDE 1 DROP: 5 SOLUTION OPHTHALMIC at 20:20

## 2024-07-13 RX ADMIN — ACETAMINOPHEN 975 MG: 325 TABLET ORAL at 23:41

## 2024-07-13 RX ADMIN — FLUORESCEIN SODIUM 1 STRIP: 1 STRIP OPHTHALMIC at 20:20

## 2024-07-13 ASSESSMENT — PAIN DESCRIPTION - ORIENTATION
ORIENTATION: LEFT
ORIENTATION: LEFT

## 2024-07-13 ASSESSMENT — PAIN DESCRIPTION - LOCATION
LOCATION: EYE
LOCATION: EYE

## 2024-07-13 ASSESSMENT — COLUMBIA-SUICIDE SEVERITY RATING SCALE - C-SSRS
2. HAVE YOU ACTUALLY HAD ANY THOUGHTS OF KILLING YOURSELF?: NO
6. HAVE YOU EVER DONE ANYTHING, STARTED TO DO ANYTHING, OR PREPARED TO DO ANYTHING TO END YOUR LIFE?: NO
1. IN THE PAST MONTH, HAVE YOU WISHED YOU WERE DEAD OR WISHED YOU COULD GO TO SLEEP AND NOT WAKE UP?: NO
1. IN THE PAST MONTH, HAVE YOU WISHED YOU WERE DEAD OR WISHED YOU COULD GO TO SLEEP AND NOT WAKE UP?: NO
6. HAVE YOU EVER DONE ANYTHING, STARTED TO DO ANYTHING, OR PREPARED TO DO ANYTHING TO END YOUR LIFE?: NO
2. HAVE YOU ACTUALLY HAD ANY THOUGHTS OF KILLING YOURSELF?: NO

## 2024-07-13 ASSESSMENT — LIFESTYLE VARIABLES
HAVE YOU EVER FELT YOU SHOULD CUT DOWN ON YOUR DRINKING: NO
TOTAL SCORE: 0
HAVE PEOPLE ANNOYED YOU BY CRITICIZING YOUR DRINKING: NO
EVER HAD A DRINK FIRST THING IN THE MORNING TO STEADY YOUR NERVES TO GET RID OF A HANGOVER: NO
EVER FELT BAD OR GUILTY ABOUT YOUR DRINKING: NO

## 2024-07-13 ASSESSMENT — VISUAL ACUITY
OU: 20/25
OD: 20/20

## 2024-07-13 ASSESSMENT — PAIN DESCRIPTION - PAIN TYPE
TYPE: ACUTE PAIN
TYPE: ACUTE PAIN

## 2024-07-13 ASSESSMENT — PAIN - FUNCTIONAL ASSESSMENT: PAIN_FUNCTIONAL_ASSESSMENT: 0-10

## 2024-07-13 ASSESSMENT — PAIN SCALES - GENERAL
PAINLEVEL_OUTOF10: 4
PAINLEVEL_OUTOF10: 6

## 2024-07-14 VITALS
TEMPERATURE: 97.7 F | WEIGHT: 260 LBS | RESPIRATION RATE: 18 BRPM | DIASTOLIC BLOOD PRESSURE: 73 MMHG | SYSTOLIC BLOOD PRESSURE: 167 MMHG | BODY MASS INDEX: 39.4 KG/M2 | HEIGHT: 68 IN | HEART RATE: 58 BPM | OXYGEN SATURATION: 98 %

## 2024-07-14 PROCEDURE — 2500000001 HC RX 250 WO HCPCS SELF ADMINISTERED DRUGS (ALT 637 FOR MEDICARE OP)

## 2024-07-14 RX ORDER — PREDNISOLONE ACETATE 10 MG/ML
1 SUSPENSION/ DROPS OPHTHALMIC ONCE
Status: DISCONTINUED | OUTPATIENT
Start: 2024-07-14 | End: 2024-07-14 | Stop reason: HOSPADM

## 2024-07-14 RX ORDER — ERYTHROMYCIN 5 MG/G
1 OINTMENT OPHTHALMIC ONCE
Status: COMPLETED | OUTPATIENT
Start: 2024-07-14 | End: 2024-07-14

## 2024-07-14 RX ORDER — PREDNISOLONE ACETATE 10 MG/ML
1 SUSPENSION/ DROPS OPHTHALMIC 4 TIMES DAILY
Qty: 10 ML | Refills: 0 | Status: SHIPPED | OUTPATIENT
Start: 2024-07-14 | End: 2024-07-28

## 2024-07-14 RX ORDER — ERYTHROMYCIN 5 MG/G
1 OINTMENT OPHTHALMIC 2 TIMES DAILY
Qty: 3.5 G | Refills: 0 | Status: SHIPPED | OUTPATIENT
Start: 2024-07-14 | End: 2024-07-21

## 2024-07-14 RX ADMIN — ERYTHROMYCIN 1 CM: 5 OINTMENT OPHTHALMIC at 00:29

## 2024-07-14 NOTE — DISCHARGE INSTRUCTIONS
Transfer to [facility]   [address] [place] [address]     Go directly to emergency room

## 2024-07-14 NOTE — PROGRESS NOTES
TRANSFER ACCEPTANCE NOTE  LEFT EYE TRAUMA  IOP 38  CONJUNCTIVAL ABRASION  ACCEPTED BY OPTHO   LESS CONCERN FOR OPEN GLOBE

## 2024-07-14 NOTE — CONSULTS
Reason for consult: Trauma OS    72 y/o M POH wet AMD OS undergoing intravitreal injections (outside retina, baseline VA ~20/200), dry AMD OD, cataract OU presenting for eval after trauma OS.    He reports he was removing a tarp from above his pool when one of the stakes was abruptly dislodged and struck him in the left eye. The stake is metal and has a top hook with a sharp bottom spike. He thinks he was hit with the rounded top hook. He reports immediate discomfort with blurring of vision. His wife reports his eye was red and that it was watering. He denies gush of fluid or true vision loss. Also denies FBS, photophobia, flashes/floaters, curtaining, diplopia, pain with EOMs.    Past Medical History: as above  Family History: reviewed and not pertinent to chief complaint  Medications: please refer to medication reconciliation  Allergies: please refer to patient allergy list    Base Eye Exam       Visual Acuity (Snellen - Linear)         Right Left    Near cc 20/20 20/800 ph 20/400              Tonometry (Tonopen, 11:54 PM)         Right Left    Pressure 16 23              Pupils         Dark Light Shape React APD    Right 4 2 Round Brisk None    Left 4 2 Round Brisk None              Visual Fields         Left Right     Full Full              Extraocular Movement         Right Left     Full Full                  Slit Lamp and Fundus Exam       External Exam         Right Left    External Normal Normal              Slit Lamp Exam         Right Left    Lids/Lashes Normal Normal    Conjunctiva/Sclera White and quiet FILIBERTO nasally, small ~7.5mm conjunctival laceration inferotemporally    Cornea 2+ SPK Tr SPK    Anterior Chamber Deep and quiet 2+ Cell/Flare    Iris Round and reactive Round and reactive    Lens 2+ NS, 1+ Cortical 2+ NS, 1+ Cortical    Anterior Vitreous Normal Normal              Fundus Exam         Right Left    Disc Normal Normal    C/D Ratio 0.55 0.55    Macula Pigment mottling, large soft drusen  Blunted foveal reflex, many large soft drusen    Vessels Normal course and caliber Normal course and caliber    Periphery Attached 360 Attached 360                  CT Maxillofacial Bones 7/13/24  IMPRESSION:  No evidence of acute displaced orbital or maxillofacial fracture.  Clinical evaluation with dedicated ophthalmologic examination is  recommended for further assessment given the reported eye injury.    A&P:    #Traumatic Iritis OS  #Conjunctival Laceration OS  - 72 y/o M presenting for eval of trauma OS  - Exam reassuring against open globe with VA roughly at baseline, IOP 23, no RAPD and no signs of globe violation on CT or close examination  - The conjunctival laceration was carefully probed at bedside. The sclera was visualized underneath without evidence of partial or full lthickness scleral laceration; wound jean claude negative  - SLE revealing 2+ AC cell and small conjunctival laceration  - Eye cleaned and examined carefully without retained FB  - START Prednisolone gtts QID OS  - START ATs QID OS  - START Cyclopentolate BID OS  - Per Bear eye manual, small conjunctival lacerations (<1-1.5 cm) generally heal without procedural intervention and can be treated conservatively with frequent ointment use  - START Erythromycin ointment QID OS  - Very mild IOP elevation can be associated with traumatic iritis, will observe without initiating treatment for now  - Recommend Tetanus update if not up to date, will call patient to discuss this  - Ophthalmology will arrange close outpatient followup    #Wet AMD OS  #Dry AMD OD  - Followed by retina vitreous consultants, actively receiving intravitreal injections OS  - Continue AREDS-2, Amsler grid  - Continue to follow with outside Retina    Discussed with senior resident Dr. Lawrence     HTL  PGY3    Note not final until signed by attending physician    Ophthalmology Adult Pager: [phone]  Ophthalmology Peds Pager: [phone]    For adult follow up appts, call [phone]  For pediatric follow up appts, call [phone]

## 2024-07-14 NOTE — ED TRIAGE NOTES
Pt to ED, as a transfer by car from [facility], to be seen by opthalmalogy for left eye pain/injury. Pt was working on his pool when a strap to the pool cover hit him in the left eye. Pt states he has little to no vision in the affected eye.     Denies anticoagulation use, denies LOC, denies any other injuries.

## 2024-07-14 NOTE — ED PROVIDER NOTES
HPI   Chief Complaint   Patient presents with    Eye Trauma     Was working on his pool. Strap of the pool cover came up and hit him in the left eye. Eye watering. 4/10 pain. No LOC. No BT.       Is a 73-year-old male presenting to the emergency department with complaints of left eye trauma.  Patient states that he was working on his pool when a metal hook attached to the cover of his pool snapped up from the ground and hit him in the left eye.  Patient denies loss of consciousness.  He does not take any blood thinners.  Injury happened around 1:30 PM today.  Patient states since he has had significantly decreased vision of the left eye, sensitivity to light, eye pain, redness, and watery drainage.  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  No history of eye surgeries.  Patient does not have a headache.  He states that he has tenderness over the upper lid of his left eye.      Please see HPI for pertinent positive and negative ROS.                 [place] Coma Scale Score: 15                     Patient History   No past medical history on file.  No past surgical history on file.  Family History   Problem Relation Name Age of Onset    Stomach cancer Mother      Heart disease Father      Stroke Father      Coronary artery disease Sibling       Social History     Tobacco Use    Smoking status: Every Day     Types: Cigarettes     Passive exposure: Never    Smokeless tobacco: Never   Vaping Use    Vaping status: Never Used   Substance Use Topics    Alcohol use: Not Currently    Drug use: Never       Physical Exam   ED Triage Vitals [07/13/24 2010]   Temperature Heart Rate Respirations BP   36.5 °C (97.7 °F) 76 18 174/82      Pulse Ox Temp Source Heart Rate Source Patient Position   98 % Tympanic -- Sitting      BP Location FiO2 (%)     Left arm --       Physical Exam  GENERAL APPEARANCE: This  patient is in no acute respiratory distress. Awake and alert, talking appropriately. Answering questions appropriately. No evidence of pressured speech  VITAL SIGNS: As per the nurses' triage record.  HEENT: Normocephalic, atraumatic.  Extraocular movements are intact bilaterally.  There is no swelling, bruising, or erythema around the orbital regions bilaterally.  There is no tenderness to palpation over the left orbital region.  Patient does have conjunctival erythema and injection of the left eye, more specifically on the lateral aspect.  No abrasions or lacerations.  Fluorescein staining with Woods lamp shows that there is abrasion over the lateral right conjunctival region.  The conjunctival mucosa on the lateral aspect is quite erythematous and edematous.  There is no corneal abrasions noted, no hyphema.  Pressure with Jed-Pen in the left eye is 38.  Patient is not able to perform a visual acuity on the left eye due to significant decreased vision.  Visual acuity 20/20 in the right eye.  NECK:  full gross ROM during exam  MUSCULOSKELETAL:  Full gross active range of motion. Ambulating on own with no acute difficulties  NEUROLOGICAL: Awake, alert and oriented x 3.  IMMUNOLOGICAL: No palpable lymphadenopathy or lymphatic streaking noted on visible skin.  DERM: No petechiae, rashes, or ecchymoses on visible skin  PSYCH: mood and affect appear normal.  ED Course & MDM   Diagnoses as of 07/13/24 2127   Blunt injury, left eye, initial encounter   Conjunctival hemorrhage of left eye       Medical Decision Making  Parts of this chart have been completed using voice recognition software. Please excuse any errors of transcription.  My thought process and reason for plan has been formulated from the time that I saw the patient until the time of disposition and is not specific to one specific moment during their visit and furthermore my MDM encompasses this entire chart and not only this text box.      HPI: Detailed  above.    Exam: A medically appropriate exam performed, outlined above, given the known history and presentation.    History obtained from: Patient    Medications given during visit:  Medications   fluorescein 1 mg ophthalmic strip 1 strip (1 strip Right Eye Given 7/13/24 2020)   tetracaine (PF) 0.5 % ophthalmic solution 1 drop (1 drop Right Eye Given 7/13/24 2020)        Diagnostic/tests  Labs Reviewed - No data to display   CT maxillofacial bones wo IV contrast    (Results Pending)   CT 3D reconstruction    (Results Pending)        Considerations/further MDM:    The patient presents for blunt eye trauma the left eye.  Differential diagnosis includes was not limited to conjunctival hemorrhage versus conjunctival abrasion versus corneal abrasion versus globe rupture versus orbital bone injury    Did speak with ophthalmology downtown, Dr. Lawrence, who recommends patient be transferred to [facility] for ophthalmology to see him due to significantly decreased visual acuity of the left eye, and elevated eye pressure.  I did speak with the emergency room physician, Dr. Kevin.  Prior to patient's departure, did receive CT maxillofacial bone without IV contrast so this result is available when he arrives to [facility] ER.  The patient's daughter is going to drive him to [facility] ER.  I told transfer center that this is the plan for him to come by personal vehicle.  The patient was given strict instructions to go to the emergency room immediately and he verbalized understanding.  All of patient's questions were answered.  He was discharged in stable condition.  At the time of discharge, his CT maxillofacial bones is pending.  Please refer to [facility] emergency room for further evaluation and management of the patient.      Procedure  Procedures     Arleth Lane PA-C  07/13/24 2123

## 2024-07-14 NOTE — ED PROVIDER NOTES
HPI   Chief Complaint   Patient presents with    Eye Trauma       73-year-old male with history of A-fib not on anticoagulation, CAD on daily aspirin, CABG, HLD, HTN, obesity, prediabetes presents as transfer from [facility] emergency department for ophthalmology consult regarding left eye injury and decreased vision.  While working on a pool, he was pulling a strap on the pool cover.  It snapped up, causing a stake from the ground to come up and hit him in the eye.  States that the stake did not stick into his eye.  Endorsed pain and redness immediately that worsened.  Decreased vision now.  Was seen at [facility] before being transferred here.  Patient endorses persistent decreased visual acuity with poor vision from his left eye.  Denies any other injuries.  Ophthalmology was made aware by outside hospital prior to transfer.                          [place] Coma Scale Score: 15                     Patient History   No past medical history on file.  No past surgical history on file.  Family History   Problem Relation Name Age of Onset    Stomach cancer Mother      Heart disease Father      Stroke Father      Coronary artery disease Sibling       Social History     Tobacco Use    Smoking status: Every Day     Types: Cigarettes     Passive exposure: Never    Smokeless tobacco: Never   Vaping Use    Vaping status: Never Used   Substance Use Topics    Alcohol use: Not Currently    Drug use: Never       Physical Exam   ED Triage Vitals [07/13/24 2223]   Temperature Heart Rate Respirations BP   36.5 °C (97.7 °F) 62 20 180/77      Pulse Ox Temp Source Heart Rate Source Patient Position   97 % Temporal Monitor Sitting      BP Location FiO2 (%)     Left arm 21 %       Physical Exam  Constitutional:       Appearance: Normal appearance.   HENT:      Head: Normocephalic and atraumatic.      Mouth/Throat:      Mouth: Mucous membranes are moist.      Pharynx: Oropharynx is clear.   Eyes:      Extraocular Movements:  Extraocular movements intact.      Conjunctiva/sclera:      Left eye: Left conjunctiva is injected.      Pupils: Pupils are equal, round, and reactive to light.      Comments: No swelling or bruising or erythema around the periorbital areas.  No tenderness either.  Left eye injected.  Chemosis present in the left eye.   Cardiovascular:      Rate and Rhythm: Normal rate and regular rhythm.      Pulses: Normal pulses.      Heart sounds: Normal heart sounds.   Pulmonary:      Effort: Pulmonary effort is normal.      Breath sounds: Normal breath sounds.   Abdominal:      General: Abdomen is flat.      Palpations: Abdomen is soft.   Musculoskeletal:         General: Normal range of motion.      Cervical back: Normal range of motion and neck supple.   Skin:     General: Skin is warm and dry.      Capillary Refill: Capillary refill takes less than 2 seconds.   Neurological:      General: No focal deficit present.      Mental Status: He is alert and oriented to person, place, and time.   Psychiatric:         Mood and Affect: Mood normal.         Behavior: Behavior normal.         Thought Content: Thought content normal.         Judgment: Judgment normal.         ED Course & MDM   Diagnoses as of 07/14/24 0021   Traumatic iritis       Medical Decision Making  Vital signs reviewed, significant for hypertension at 180/77.  Patient overall is well-appearing and in no apparent distress.  Speaks full sentences without difficulty.  Ophthalmology was notified immediately that the patient had arrived.  Please see their note for full details and recommendations.  Tylenol given for pain control. Outside hospital noted no corneal abrasions and noticed an increased pressure with Jed-Pen in the left eye of 38.  They also noted visual acuity in the right eye 20/20.  They were unable to obtain visual acuity in the left eye.  Ophthalmology was able to dilate the patient and perform slit-lamp exam.  Diagnosed with traumatic iritis.   Prescribed prednisolone drops, erythromycin ointment in the ED.  Given prescriptions for these as well as artificial tears to use at home.  Advised to fill the prescriptions as soon as possible.  Advised to return to the ED with any new or worsening symptoms.  Encouraged to follow-up as recommended by ophthalmology.  Ophthalmology physician states that ophtho team will arrange follow-up.  I did include ophtho clinic phone number with the patient in case they do not call.  Patient in agreement with this plan.  States that he feels improved and is ready to go.  Discharged in stable condition.        Procedure  Procedures     Kraig Stephenson, MEGHAN-ALLYSON  07/14/24 0035

## 2024-07-15 ENCOUNTER — TELEPHONE (OUTPATIENT)
Dept: OPHTHALMOLOGY | Facility: CLINIC | Age: 74
End: 2024-07-15
Payer: MEDICARE

## 2024-07-15 DIAGNOSIS — H20.9 TRAUMATIC IRITIS: Primary | ICD-10-CM

## 2024-07-15 RX ORDER — CYCLOPENTOLATE HYDROCHLORIDE 10 MG/ML
1 SOLUTION/ DROPS OPHTHALMIC ONCE
Qty: 5 ML | Refills: 0 | Status: SHIPPED | OUTPATIENT
Start: 2024-07-15 | End: 2024-07-16 | Stop reason: SDUPTHER

## 2024-07-15 NOTE — TELEPHONE ENCOUNTER
Spoke with Dr. Conrad, schedule patient for short check this week. Patient contacted and scheduled WR

## 2024-07-16 ENCOUNTER — TELEPHONE (OUTPATIENT)
Dept: OPHTHALMOLOGY | Facility: CLINIC | Age: 74
End: 2024-07-16

## 2024-07-16 ENCOUNTER — OFFICE VISIT (OUTPATIENT)
Dept: OPHTHALMOLOGY | Facility: CLINIC | Age: 74
End: 2024-07-16
Payer: MEDICARE

## 2024-07-16 DIAGNOSIS — S05.32XD LACERATION OF LEFT CONJUNCTIVA, SUBSEQUENT ENCOUNTER: Primary | ICD-10-CM

## 2024-07-16 PROBLEM — S05.32XA: Status: ACTIVE | Noted: 2024-07-16

## 2024-07-16 PROCEDURE — 99212 OFFICE O/P EST SF 10 MIN: CPT | Performed by: OPHTHALMOLOGY

## 2024-07-16 RX ORDER — NAPHAZOLINE HCL/GLYCERIN 0.012-0.2%
DROPS OPHTHALMIC (EYE)
COMMUNITY
Start: 2024-07-14

## 2024-07-16 ASSESSMENT — REFRACTION_WEARINGRX
OS_AXIS: 093
OD_AXIS: 99
OS_SPHERE: +1.00
OD_ADD: 2.25
OD_SPHERE: +0.75
OD_CYLINDER: -1.00
SPECS_TYPE: PROGRESSIVE
OS_ADD: 2.25
OS_CYLINDER: -1.50

## 2024-07-16 ASSESSMENT — EXTERNAL EXAM - RIGHT EYE: OD_EXAM: NORMAL

## 2024-07-16 ASSESSMENT — ENCOUNTER SYMPTOMS
ENDOCRINE NEGATIVE: 0
NEUROLOGICAL NEGATIVE: 0
RESPIRATORY NEGATIVE: 0
ALLERGIC/IMMUNOLOGIC NEGATIVE: 0
EYES NEGATIVE: 0
PSYCHIATRIC NEGATIVE: 0
HEMATOLOGIC/LYMPHATIC NEGATIVE: 0
CONSTITUTIONAL NEGATIVE: 0
MUSCULOSKELETAL NEGATIVE: 0
GASTROINTESTINAL NEGATIVE: 0
CARDIOVASCULAR NEGATIVE: 0

## 2024-07-16 ASSESSMENT — TONOMETRY
IOP_METHOD: GOLDMANN APPLANATION
OS_IOP_MMHG: 25

## 2024-07-16 ASSESSMENT — EXTERNAL EXAM - LEFT EYE: OS_EXAM: NORMAL

## 2024-07-16 ASSESSMENT — SLIT LAMP EXAM - LIDS
COMMENTS: NORMAL
COMMENTS: NORMAL

## 2024-07-16 ASSESSMENT — VISUAL ACUITY
OD_CC: 20/25
METHOD: SNELLEN - LINEAR
CORRECTION_TYPE: GLASSES
OS_CC: CF AT 3'

## 2024-07-16 ASSESSMENT — PAIN SCALES - GENERAL: PAINLEVEL: 0-NO PAIN

## 2024-07-16 NOTE — ASSESSMENT & PLAN NOTE
Routine healing from conj laceration. No signs of infection. Still with good amount of inflammation. Will have continue current steroid and antibiotics. May use lubricant as needed. OK to add in cold compress. See back early next week or sooner PRN. Advised would have hold on next retina injection until ocular surface more healed and calm.

## 2024-07-16 NOTE — PROGRESS NOTES
Assessment/Plan   Problem List Items Addressed This Visit       Laceration of left conjunctiva - Primary     Routine healing from conj laceration. No signs of infection. Still with good amount of inflammation. Will have continue current steroid and antibiotics. May use lubricant as needed. OK to add in cold compress. See back early next week or sooner PRN. Advised would have hold on next retina injection until ocular surface more healed and calm.             Provided reassurance regarding above diagnoses and care received in the office visit today. Discussed outcomes and options along with the importance of treatment compliance. Understands the importance of any follow up visits. Patient instructed to call/communicate with our office if any new issues, questions, or concerns.     Will plan to see back in 1 week short check or sooner PRN

## 2024-07-16 NOTE — PATIENT INSTRUCTIONS
Thank you so much for choosing me to provide your care today!    If you were dilated your vision may remain blurry   or light sensitive for several hours.    The nature of eye and vision problems can require frequent follow up, please make every effort to adhere to any future appointments.    If you have any issues, questions, or concerns,   please do not hesitate to reach out.    If you receive a survey in regards to your care today, please mention any exceptional care my office staff and/or technicians provided.    You can reach our office at this number:  [phone]   
Occiput Anterior

## 2024-07-16 NOTE — LETTER
"July 16, 2024    Osvaldo Winters MD  [address]  [facility], [facility] [address]    Patient: Ramsey Hebert   YOB: 1950   Date of Visit: 7/16/2024       Dear Dr. Osvaldo Winters MD:    Thank you for referring Ramsey Hebert to me for evaluation. Here is my assessment and plan of care:    Assessment/Plan:  Ramsey was seen today for eye trauma.  Diagnoses and all orders for this visit:  Laceration of left conjunctiva, subsequent encounter (Primary)    Post traumatic conjunctival laceration. Well healing but still with significant inflammation. Will have continue antibiotic drops and steroid. Will plan to check back here in 1 week or sooner PRN.     Below you can find relevant pieces of the exam. If you have questions, please do not hesitate to call me. I look forward to following Ramsey along with you.         Sincerely,        Logan Conrad MD        CC:   No Recipients         External Exam         Right Left    External Normal Normal              Slit Lamp Exam         Right Left    Lids/Lashes Normal Normal    Conjunctiva/Sclera White and quiet FILIBERTO inferior, inferior chemosis, 3mm residual wedge shaped conjunctival laceration    Cornea 2+ SPK Tr SPK    Anterior Chamber Deep and quiet 1+ Flare, 1+ Cell    Iris Round and reactive Round and reactive    Lens 2+ NS, 1+ Cortical 2+ NS, 1+ Cortical              Fundus Exam         Right Left    Vitreous Normal Normal    Disc Normal Normal    C/D Ratio 0.55 0.55    Macula Pigment mottling, large soft drusen Blunted foveal reflex, many large soft drusen    Vessels Normal course and caliber Normal course and caliber    Periphery Attached 360 Attached 360                   <div id=\"[email or web address]\"></div>     "

## 2024-07-21 DIAGNOSIS — I10 ESSENTIAL (PRIMARY) HYPERTENSION: ICD-10-CM

## 2024-07-22 ENCOUNTER — APPOINTMENT (OUTPATIENT)
Dept: OPHTHALMOLOGY | Facility: CLINIC | Age: 74
End: 2024-07-22
Payer: MEDICARE

## 2024-07-22 DIAGNOSIS — I48.0 PAROXYSMAL ATRIAL FIBRILLATION (MULTI): Primary | ICD-10-CM

## 2024-07-22 DIAGNOSIS — S05.32XD LACERATION OF LEFT CONJUNCTIVA, SUBSEQUENT ENCOUNTER: Primary | ICD-10-CM

## 2024-07-22 PROCEDURE — 99212 OFFICE O/P EST SF 10 MIN: CPT | Performed by: OPHTHALMOLOGY

## 2024-07-22 RX ORDER — METOPROLOL TARTRATE 25 MG/1
25 TABLET, FILM COATED ORAL 2 TIMES DAILY
Qty: 180 TABLET | Refills: 4 | Status: SHIPPED | OUTPATIENT
Start: 2024-07-22

## 2024-07-22 RX ORDER — LISINOPRIL AND HYDROCHLOROTHIAZIDE 10; 12.5 MG/1; MG/1
1 TABLET ORAL DAILY
Qty: 90 TABLET | Refills: 2 | Status: SHIPPED | OUTPATIENT
Start: 2024-07-22

## 2024-07-22 ASSESSMENT — PATIENT HEALTH QUESTIONNAIRE - PHQ9
SUM OF ALL RESPONSES TO PHQ9 QUESTIONS 1 AND 2: 0
1. LITTLE INTEREST OR PLEASURE IN DOING THINGS: NOT AT ALL
2. FEELING DOWN, DEPRESSED OR HOPELESS: NOT AT ALL

## 2024-07-22 ASSESSMENT — VISUAL ACUITY
OD_CC+: -1
OS_CC+: +1
METHOD: SNELLEN - LINEAR
CORRECTION_TYPE: GLASSES
OD_CC: 20/20
OS_CC: 20/100

## 2024-07-22 ASSESSMENT — SLIT LAMP EXAM - LIDS
COMMENTS: NORMAL
COMMENTS: NORMAL

## 2024-07-22 ASSESSMENT — TONOMETRY
OS_IOP_MMHG: 20
IOP_METHOD: GOLDMANN APPLANATION

## 2024-07-22 ASSESSMENT — EXTERNAL EXAM - RIGHT EYE: OD_EXAM: NORMAL

## 2024-07-22 ASSESSMENT — EXTERNAL EXAM - LEFT EYE: OS_EXAM: NORMAL

## 2024-07-22 ASSESSMENT — PAIN SCALES - GENERAL: PAINLEVEL: 0-NO PAIN

## 2024-07-22 NOTE — PATIENT INSTRUCTIONS
Thank you so much for choosing me to provide your care today!    If you were dilated your vision may remain blurry   or light sensitive for several hours.    The nature of eye and vision problems can require frequent follow up, please make every effort to adhere to any future appointments.    If you have any issues, questions, or concerns,   please do not hesitate to reach out.    If you receive a survey in regards to your care today, please mention any exceptional care my office staff and/or technicians provided.    You can reach our office at this number:  [phone]

## 2024-07-22 NOTE — ASSESSMENT & PLAN NOTE
Laceration appears well healed beyond slight residual post inflammatory changes. OK to stop steroid and would have lubricate well. OK to proceed with retinal injections at this time.

## 2024-07-22 NOTE — PROGRESS NOTES
Assessment/Plan   Problem List Items Addressed This Visit       Laceration of left conjunctiva - Primary     Laceration appears well healed beyond slight residual post inflammatory changes. OK to stop steroid and would have lubricate well. OK to proceed with retinal injections at this time.             Provided reassurance regarding above diagnoses and care received in the office visit today. Discussed outcomes and options along with the importance of treatment compliance. Understands the importance of any follow up visits. Patient instructed to call/communicate with our office if any new issues, questions, or concerns.     Will plan to see back in 6 months short check and refraction or sooner PRN

## 2024-11-04 NOTE — PROGRESS NOTES
Subjective   Reason for Visit: Ramsey Hebert is an 74 y.o. male here for a follow up    \[facility] [place] [facility]\""  Patient Care Team:  Pradip Alvarado PA-C as PCP - General (Internal Medicine)     A-fib/HTN/CAD/NSVT:  Seeing cardiology.  Taking Amiodarone, Plavix, Lisinopril-hydrochlorothiazide, Metoprolol.  BP today is 114/76.  Denies dizziness, headaches.    Bunion:  I referred to podiatry last visit.  Right foot.  Symptoms x years.  Bothersome.    HLD:  Taking Atorvastatin.  Last checked April 2024.    Diabetes:  New onset in April 2024 with A1C of 7.2.  Taking Metformin - having GI side effects.  Last A1C was 6.1 in July 2024.  POC today is 6.3.  Checking sugars at home and getting low 100s.    Elevated PSA:  Last checked April 2024 and was improving.    GERD:  Had been on Protonix in the past.  Doing well.    Hand pain:  I gave Voltaren gel last visit - this did not help much.  Consider ortho.  Both hands.  Worse in his right hand.  Worse with activity.    Leg cramping:  I recommended magnesium last visit.  Consider a muscle relaxer.  Happening off and on.  Both legs.  Only while walking.    Health maintenance:  Smoking: Current smoker.  PSA (50+): April 2024.  Labs: April 2024.  Prevnar 13 (65+): 2013  Pneumovax 23 (65+, 1 year after Prev 13): 2016  Influenza: Nov 2024.  Zostavax (60+, 1 time, at pharmacy):    Review of Systems  12 point review of systems negative unless stated above in HPI    Objective   Vitals:  /76   Pulse 56   Wt 117 kg (258 lb 9.6 oz)   SpO2 96%   BMI 39.32 kg/m²       Physical Exam  General: Alert and oriented, well nourished, no acute distress.  Lungs: Clear to auscultation, non-labored respiration.  Heart: Normal rate, regular rhythm, no murmur, gallop or edema.  Neurologic: Awake, alert, and oriented X3, CN II-XII intact.  Psychiatric: Cooperative, appropriate mood and affect.    Assessment/Plan   It was good seeing you!  Let's stop the Metformin since this is causing side effects.  I have sent  in Glimepiride as an alternative.  Continue with diet and exercise.  Continue specialty care.  High dose flu vaccine given today.  Continue the same medications.  Chronic conditions are stable.  Call with questions or concerns.    Follow up  In April  Problem List Items Addressed This Visit          Cardiac and Vasculature    CAD (coronary artery disease)    Hyperlipidemia    NSVT (nonsustained ventricular tachycardia) (Multi)    Atrial fibrillation (Multi)    Paroxysmal atrial fibrillation (Multi)       Endocrine/Metabolic    Type 2 diabetes mellitus without complication, without long-term current use of insulin (Multi) - Primary    Relevant Medications    glimepiride (AmaryL) 1 mg tablet    Other Relevant Orders    POCT glycosylated hemoglobin (Hb A1C) manually resulted (Completed)       Gastrointestinal and Abdominal    Gastroesophageal reflux disease without esophagitis       Genitourinary and Reproductive    Elevated PSA       Musculoskeletal and Injuries    Pain in both hands    Bunion of right foot       Symptoms and Signs    Leg cramping     Other Visit Diagnoses       Benign hypertension        Obesity, morbid (Multi)        Need for immunization against influenza        Relevant Orders    Flu vaccine, trivalent, preservative free, HIGH-DOSE, age 65y+ (Fluzone) (Completed)

## 2024-11-13 ENCOUNTER — OFFICE VISIT (OUTPATIENT)
Dept: PRIMARY CARE | Facility: CLINIC | Age: 74
End: 2024-11-13
Payer: MEDICARE

## 2024-11-13 VITALS
DIASTOLIC BLOOD PRESSURE: 76 MMHG | BODY MASS INDEX: 39.32 KG/M2 | HEART RATE: 56 BPM | OXYGEN SATURATION: 96 % | WEIGHT: 258.6 LBS | SYSTOLIC BLOOD PRESSURE: 114 MMHG

## 2024-11-13 DIAGNOSIS — M79.642 PAIN IN BOTH HANDS: ICD-10-CM

## 2024-11-13 DIAGNOSIS — E11.9 TYPE 2 DIABETES MELLITUS WITHOUT COMPLICATION, WITHOUT LONG-TERM CURRENT USE OF INSULIN (MULTI): Primary | ICD-10-CM

## 2024-11-13 DIAGNOSIS — I47.29 NSVT (NONSUSTAINED VENTRICULAR TACHYCARDIA) (MULTI): ICD-10-CM

## 2024-11-13 DIAGNOSIS — M79.641 PAIN IN BOTH HANDS: ICD-10-CM

## 2024-11-13 DIAGNOSIS — E78.00 PURE HYPERCHOLESTEROLEMIA: ICD-10-CM

## 2024-11-13 DIAGNOSIS — K21.9 GASTROESOPHAGEAL REFLUX DISEASE WITHOUT ESOPHAGITIS: ICD-10-CM

## 2024-11-13 DIAGNOSIS — I48.91 ATRIAL FIBRILLATION, UNSPECIFIED TYPE (MULTI): ICD-10-CM

## 2024-11-13 DIAGNOSIS — R97.20 ELEVATED PSA: ICD-10-CM

## 2024-11-13 DIAGNOSIS — I48.0 PAROXYSMAL ATRIAL FIBRILLATION (MULTI): ICD-10-CM

## 2024-11-13 DIAGNOSIS — I10 BENIGN HYPERTENSION: ICD-10-CM

## 2024-11-13 DIAGNOSIS — R25.2 LEG CRAMPING: ICD-10-CM

## 2024-11-13 DIAGNOSIS — Z23 NEED FOR IMMUNIZATION AGAINST INFLUENZA: ICD-10-CM

## 2024-11-13 DIAGNOSIS — E66.01 OBESITY, MORBID (MULTI): ICD-10-CM

## 2024-11-13 DIAGNOSIS — M21.611 BUNION OF RIGHT FOOT: ICD-10-CM

## 2024-11-13 DIAGNOSIS — I25.10 CORONARY ARTERY DISEASE INVOLVING NATIVE HEART WITHOUT ANGINA PECTORIS, UNSPECIFIED VESSEL OR LESION TYPE: ICD-10-CM

## 2024-11-13 LAB — POC HEMOGLOBIN A1C: 6.3 (ref 4.2–6.5)

## 2024-11-13 PROCEDURE — 3048F LDL-C <100 MG/DL: CPT | Performed by: PHYSICIAN ASSISTANT

## 2024-11-13 PROCEDURE — 1157F ADVNC CARE PLAN IN RCRD: CPT | Performed by: PHYSICIAN ASSISTANT

## 2024-11-13 PROCEDURE — 1159F MED LIST DOCD IN RCRD: CPT | Performed by: PHYSICIAN ASSISTANT

## 2024-11-13 PROCEDURE — 3074F SYST BP LT 130 MM HG: CPT | Performed by: PHYSICIAN ASSISTANT

## 2024-11-13 PROCEDURE — 1160F RVW MEDS BY RX/DR IN RCRD: CPT | Performed by: PHYSICIAN ASSISTANT

## 2024-11-13 PROCEDURE — 1126F AMNT PAIN NOTED NONE PRSNT: CPT | Performed by: PHYSICIAN ASSISTANT

## 2024-11-13 PROCEDURE — 3051F HG A1C>EQUAL 7.0%<8.0%: CPT | Performed by: PHYSICIAN ASSISTANT

## 2024-11-13 PROCEDURE — 3078F DIAST BP <80 MM HG: CPT | Performed by: PHYSICIAN ASSISTANT

## 2024-11-13 PROCEDURE — 4004F PT TOBACCO SCREEN RCVD TLK: CPT | Performed by: PHYSICIAN ASSISTANT

## 2024-11-13 PROCEDURE — 90662 IIV NO PRSV INCREASED AG IM: CPT | Performed by: PHYSICIAN ASSISTANT

## 2024-11-13 PROCEDURE — 99213 OFFICE O/P EST LOW 20 MIN: CPT | Performed by: PHYSICIAN ASSISTANT

## 2024-11-13 PROCEDURE — 83036 HEMOGLOBIN GLYCOSYLATED A1C: CPT | Mod: MUE | Performed by: PHYSICIAN ASSISTANT

## 2024-11-13 RX ORDER — GLIMEPIRIDE 1 MG/1
1 TABLET ORAL
Qty: 90 TABLET | Refills: 1 | Status: SHIPPED | OUTPATIENT
Start: 2024-11-13 | End: 2025-05-12

## 2024-11-13 ASSESSMENT — PAIN SCALES - GENERAL: PAINLEVEL_OUTOF10: 0-NO PAIN

## 2024-11-13 ASSESSMENT — LIFESTYLE VARIABLES
AUDIT-C TOTAL SCORE: 0
HOW OFTEN DO YOU HAVE SIX OR MORE DRINKS ON ONE OCCASION: NEVER
HOW OFTEN DO YOU HAVE A DRINK CONTAINING ALCOHOL: NEVER
HOW MANY STANDARD DRINKS CONTAINING ALCOHOL DO YOU HAVE ON A TYPICAL DAY: PATIENT DOES NOT DRINK
SKIP TO QUESTIONS 9-10: 1

## 2024-11-13 ASSESSMENT — ENCOUNTER SYMPTOMS
LOSS OF SENSATION IN FEET: 0
OCCASIONAL FEELINGS OF UNSTEADINESS: 0
DEPRESSION: 0

## 2025-01-07 ENCOUNTER — APPOINTMENT (OUTPATIENT)
Dept: PODIATRY | Facility: CLINIC | Age: 75
End: 2025-01-07
Payer: MEDICARE

## 2025-01-07 DIAGNOSIS — M79.672 PAIN IN BOTH FEET: ICD-10-CM

## 2025-01-07 DIAGNOSIS — E11.9 ENCOUNTER FOR DIABETIC FOOT EXAM (MULTI): Primary | ICD-10-CM

## 2025-01-07 DIAGNOSIS — M21.611 BUNION OF RIGHT FOOT: ICD-10-CM

## 2025-01-07 DIAGNOSIS — M79.671 PAIN IN BOTH FEET: ICD-10-CM

## 2025-01-07 PROCEDURE — 1159F MED LIST DOCD IN RCRD: CPT | Performed by: PODIATRIST

## 2025-01-07 PROCEDURE — 99203 OFFICE O/P NEW LOW 30 MIN: CPT | Performed by: PODIATRIST

## 2025-01-07 PROCEDURE — 1160F RVW MEDS BY RX/DR IN RCRD: CPT | Performed by: PODIATRIST

## 2025-01-07 PROCEDURE — 1157F ADVNC CARE PLAN IN RCRD: CPT | Performed by: PODIATRIST

## 2025-01-07 PROCEDURE — G2211 COMPLEX E/M VISIT ADD ON: HCPCS | Performed by: PODIATRIST

## 2025-01-07 NOTE — PROGRESS NOTES
This is a 74 y.o. male new patient for Dm exam  Foot deformiaty    History of Present Illness:   Patient states they are here for Dm exam  Denies NTB to feet  Most recent A1C is 6.3 from Nov 2024  Unable to safely trim nails on own - thick and discolored    Past Medical History  History reviewed. No pertinent past medical history.    Medications and Allergies have been reviewed.    Review Of Systems:  GENERAL: No weight loss, malaise or fevers.  HEENT: Negative for frequent or significant headaches,   RESPIRATORY: Negative for cough, wheezing or shortness of breath.  CARDIOVASCULAR: Negative for chest pain, leg swelling or palpitations.    Examination of Both Lower Extremities:   Objective:   Vasc: DP and PT pulses are palpable bilateral.  CFT is less than 3 seconds bilateral.  Skin temperature is warm to cool proximal to distal bilateral.      Neuro: Vibratory, light touch and proprioception are intact bilateral.    Derm: Nails 1-5 bilateral are intact, thick and discolored  Skin is supple with normal texture and turgor noted.  Webspaces are clean, dry and intact bilateral.  There are no hyperkeratoses, ulcerations, verruca or other lesions noted.      Ortho: Muscle strength is 5/5 for all pedal groups tested.  Contracted digits, limited ROM to bl 1st MTPJ. No edema, erythema or ecchymosis noted    1. Encounter for diabetic foot exam (Multi)        2. Bunion of right foot  Referral to Podiatry      3. Pain in both feet          Patient exam and eval  Patient educated on proper diabetic foot care.  Nails 1-5 b/l were debrided in thickness and length with nail cutting forceps.  A1C revd, WNL  Discussed cons and surgical options. Deferred surgical. Discussed stiff shoes, Minimize walking barefoot. Shoes that do not twist or bend  Low pedal risk noted  Fu 12 mos  Sooner if any n ew prob arise  Patient was in agreement to this plan. All questions answered.      Stephanie Spicer DPM  [phone]  [facility]  Fax:  [phone]

## 2025-01-16 NOTE — PROGRESS NOTES
Subjective   Patient ID:   Ramsey Hebert is a 74 y.o. male who presents for No chief complaint on file..  HPI  Here with acute symptoms:  Symptoms x    A-fib/HTN/CAD/NSVT:  Seeing cardiology.  Taking Amiodarone, Plavix, Lisinopril-hydrochlorothiazide, Metoprolol.  BP today is   Denies dizziness, headaches.     Bunion:  I referred to podiatry previously.  Right foot.  Symptoms x years.  Bothersome.     HLD:  Taking Atorvastatin.  Last checked April 2024.     Diabetes:  New onset in April 2024 with A1C of 7.2.  We stopped Metformin due to SE's last visit.  I sent in Glimepiride as an alternative.  Last A1C was 6.3 in Nov 2024.  Checking sugars at home and getting low 100s.     Elevated PSA:  Last checked April 2024 and was improving.     GERD:  Had been on Protonix in the past.  Doing well.     Hand pain:  I gave Voltaren gel last visit - this did not help much.  Consider ortho.  Both hands.  Worse in his right hand.  Worse with activity.     Leg cramping:  I recommended magnesium previously.  Consider a muscle relaxer.  Happening off and on.  Both legs.  Only while walking.     Health maintenance:  Smoking: Current smoker.  PSA (50+): April 2024.  Labs: April 2024.  Prevnar 13 (65+): 2013  Pneumovax 23 (65+, 1 year after Prev 13): 2016  Influenza: Nov 2024.  Zostavax (60+, 1 time, at pharmacy):    Review of Systems  12 point review of systems negative unless stated above in HPI    There were no vitals filed for this visit.    Physical Exam  General: Alert and oriented, well nourished, no acute distress.  Lungs: Clear to auscultation, non-labored respiration.  Heart: Normal rate, regular rhythm, no murmur, gallop or edema.  Neurologic: Awake, alert, and oriented X3, CN II-XII intact.  Psychiatric: Cooperative, appropriate mood and affect.    Assessment/Plan   Diagnoses and all orders for this visit:  Type 2 diabetes mellitus without complication, without long-term current use of insulin (Multi)  Atrial fibrillation,  unspecified type (Multi)  Benign hypertension  Pure hypercholesterolemia  Coronary artery disease involving native heart without angina pectoris, unspecified vessel or lesion type  NSVT (nonsustained ventricular tachycardia) (Multi)

## 2025-01-21 ENCOUNTER — TELEMEDICINE (OUTPATIENT)
Dept: PRIMARY CARE | Facility: CLINIC | Age: 75
End: 2025-01-21
Payer: COMMERCIAL

## 2025-01-21 DIAGNOSIS — I48.0 PAROXYSMAL ATRIAL FIBRILLATION (MULTI): ICD-10-CM

## 2025-01-21 DIAGNOSIS — I25.10 CORONARY ARTERY DISEASE INVOLVING NATIVE HEART WITHOUT ANGINA PECTORIS, UNSPECIFIED VESSEL OR LESION TYPE: ICD-10-CM

## 2025-01-21 DIAGNOSIS — E11.9 TYPE 2 DIABETES MELLITUS WITHOUT COMPLICATION, WITHOUT LONG-TERM CURRENT USE OF INSULIN (MULTI): Primary | ICD-10-CM

## 2025-01-21 DIAGNOSIS — I48.91 ATRIAL FIBRILLATION, UNSPECIFIED TYPE (MULTI): ICD-10-CM

## 2025-01-21 DIAGNOSIS — E78.00 PURE HYPERCHOLESTEROLEMIA: ICD-10-CM

## 2025-01-21 DIAGNOSIS — R05.1 ACUTE COUGH: ICD-10-CM

## 2025-01-21 DIAGNOSIS — I47.29 NSVT (NONSUSTAINED VENTRICULAR TACHYCARDIA) (MULTI): ICD-10-CM

## 2025-01-21 DIAGNOSIS — J01.10 ACUTE NON-RECURRENT FRONTAL SINUSITIS: ICD-10-CM

## 2025-01-21 DIAGNOSIS — I10 BENIGN HYPERTENSION: ICD-10-CM

## 2025-01-21 DIAGNOSIS — R09.81 NASAL CONGESTION: ICD-10-CM

## 2025-01-21 PROBLEM — E66.01 MORBID OBESITY (MULTI): Status: RESOLVED | Noted: 2024-04-11 | Resolved: 2025-01-21

## 2025-01-21 PROCEDURE — 1160F RVW MEDS BY RX/DR IN RCRD: CPT | Performed by: PHYSICIAN ASSISTANT

## 2025-01-21 PROCEDURE — 1126F AMNT PAIN NOTED NONE PRSNT: CPT | Performed by: PHYSICIAN ASSISTANT

## 2025-01-21 PROCEDURE — 1157F ADVNC CARE PLAN IN RCRD: CPT | Performed by: PHYSICIAN ASSISTANT

## 2025-01-21 PROCEDURE — 99212 OFFICE O/P EST SF 10 MIN: CPT | Performed by: PHYSICIAN ASSISTANT

## 2025-01-21 PROCEDURE — 1159F MED LIST DOCD IN RCRD: CPT | Performed by: PHYSICIAN ASSISTANT

## 2025-01-21 RX ORDER — AMOXICILLIN AND CLAVULANATE POTASSIUM 875; 125 MG/1; MG/1
875 TABLET, FILM COATED ORAL 2 TIMES DAILY
Qty: 20 TABLET | Refills: 0 | Status: SHIPPED | OUTPATIENT
Start: 2025-01-21 | End: 2025-01-31

## 2025-01-21 RX ORDER — BENZONATATE 100 MG/1
100 CAPSULE ORAL 3 TIMES DAILY PRN
Qty: 42 CAPSULE | Refills: 0 | Status: SHIPPED | OUTPATIENT
Start: 2025-01-21 | End: 2025-02-04

## 2025-01-21 ASSESSMENT — ENCOUNTER SYMPTOMS
OCCASIONAL FEELINGS OF UNSTEADINESS: 0
LOSS OF SENSATION IN FEET: 0
DEPRESSION: 0

## 2025-01-21 ASSESSMENT — LIFESTYLE VARIABLES
HOW OFTEN DO YOU HAVE SIX OR MORE DRINKS ON ONE OCCASION: NEVER
SKIP TO QUESTIONS 9-10: 1
AUDIT-C TOTAL SCORE: 0
HOW MANY STANDARD DRINKS CONTAINING ALCOHOL DO YOU HAVE ON A TYPICAL DAY: PATIENT DOES NOT DRINK
HOW OFTEN DO YOU HAVE A DRINK CONTAINING ALCOHOL: NEVER

## 2025-01-21 ASSESSMENT — PATIENT HEALTH QUESTIONNAIRE - PHQ9
1. LITTLE INTEREST OR PLEASURE IN DOING THINGS: NOT AT ALL
SUM OF ALL RESPONSES TO PHQ9 QUESTIONS 1 AND 2: 0
2. FEELING DOWN, DEPRESSED OR HOPELESS: NOT AT ALL

## 2025-01-21 ASSESSMENT — PAIN SCALES - GENERAL: PAINLEVEL_OUTOF10: 0-NO PAIN

## 2025-01-21 NOTE — PROGRESS NOTES
Subjective   Patient ID:   Ramsey Hebert is a 74 y.o. male who presents for URI and Cough.    This visit was completed via Audio/visual due to the restrictions of the COVID-19 pandemic. All issues as below were discussed and addressed but no physical exam was performed. If it was felt that the patient should be evaluated in clinic then they were directed there. The patient verbally consented to visit.    HPI  Calling with acute symptoms:  Symptoms x 5 days.  Cough.  Mucus production.  Nasal congestion.  Taking Nyquil and Dayquil with some relief.  Denies fever, SOB    A-fib/HTN/CAD/NSVT:  Seeing cardiology.  Taking Amiodarone, Plavix, Lisinopril-hydrochlorothiazide, Metoprolol.  Denies dizziness, headaches.     Bunion:  I referred to podiatry previously.  Right foot.  Symptoms x years.  Bothersome.     HLD:  Taking Atorvastatin.  Last checked April 2024.     Diabetes:  New onset in April 2024 with A1C of 7.2.  We stopped Metformin due to SE's last visit.  I sent in Glimepiride as an alternative.  Last A1C was 6.3 in Nov 2024.  Checking sugars at home and getting low 100s.     Elevated PSA:  Last checked April 2024 and was improving.     GERD:  Had been on Protonix in the past.  Doing well.     Hand pain:  I gave Voltaren gel last visit - this did not help much.  Consider ortho.  Both hands.  Worse in his right hand.  Worse with activity.     Leg cramping:  I recommended magnesium previously.  Consider a muscle relaxer.  Happening off and on.  Both legs.  Only while walking.     Health maintenance:  Smoking: Current smoker.  PSA (50+): April 2024.  Labs: April 2024.  Prevnar 13 (65+): 2013  Pneumovax 23 (65+, 1 year after Prev 13): 2016  Influenza: Nov 2024.  Zostavax (60+, 1 time, at pharmacy):    Review of Systems  12 point review of systems negative unless stated above in HPI    Assessment/Plan   I am sorry you do not feel well!  You appear to have a sinus infection.  I have sent in the antibiotic Augmentin to  your pharmacy.  Please take with food.  I have also sent in a cough medication called Tessalon perles.  Increase rest and fluids.  Please call if symptoms worsen or do not improve.    F/u  As scheduled  Diagnoses and all orders for this visit:  Type 2 diabetes mellitus without complication, without long-term current use of insulin (Multi)  Atrial fibrillation, unspecified type (Multi)  Benign hypertension  Pure hypercholesterolemia  Coronary artery disease involving native heart without angina pectoris, unspecified vessel or lesion type  NSVT (nonsustained ventricular tachycardia) (Multi)  Paroxysmal atrial fibrillation (Multi)  Acute cough  Nasal congestion  Acute non-recurrent frontal sinusitis  -     amoxicillin-pot clavulanate (Augmentin) 875-125 mg tablet; Take 1 tablet (875 mg) by mouth 2 times a day for 10 days.  -     benzonatate (Tessalon) 100 mg capsule; Take 1 capsule (100 mg) by mouth 3 times a day as needed for cough for up to 14 days. Do not crush or chew.    Prep time on date of the patient encounter: 4 mins  Time spent directly with patient/family/caregiver: 5 mins  Documentation time: 5 mins  Other time spent:   Details of other time spent:   Total time on date of patient encounter: 14 mins

## 2025-01-22 ENCOUNTER — APPOINTMENT (OUTPATIENT)
Dept: OPHTHALMOLOGY | Facility: CLINIC | Age: 75
End: 2025-01-22
Payer: MEDICARE

## 2025-01-23 DIAGNOSIS — E78.5 HYPERLIPIDEMIA, UNSPECIFIED: ICD-10-CM

## 2025-01-28 RX ORDER — ATORVASTATIN CALCIUM 80 MG/1
80 TABLET, FILM COATED ORAL DAILY
Qty: 90 TABLET | Refills: 2 | OUTPATIENT
Start: 2025-01-28

## 2025-02-14 DIAGNOSIS — E78.5 HYPERLIPIDEMIA, UNSPECIFIED: ICD-10-CM

## 2025-02-14 RX ORDER — ATORVASTATIN CALCIUM 80 MG/1
80 TABLET, FILM COATED ORAL DAILY
Qty: 90 TABLET | Refills: 2 | OUTPATIENT
Start: 2025-02-14

## 2025-02-17 DIAGNOSIS — E78.5 HYPERLIPIDEMIA, UNSPECIFIED: ICD-10-CM

## 2025-02-17 RX ORDER — ATORVASTATIN CALCIUM 80 MG/1
80 TABLET, FILM COATED ORAL DAILY
Qty: 90 TABLET | Refills: 2 | Status: SHIPPED | OUTPATIENT
Start: 2025-02-17

## 2025-02-17 RX ORDER — ATORVASTATIN CALCIUM 80 MG/1
80 TABLET, FILM COATED ORAL DAILY
Qty: 90 TABLET | Refills: 2 | OUTPATIENT
Start: 2025-02-17

## 2025-03-06 DIAGNOSIS — E11.9 TYPE 2 DIABETES MELLITUS WITHOUT COMPLICATION, WITHOUT LONG-TERM CURRENT USE OF INSULIN (MULTI): ICD-10-CM

## 2025-03-07 RX ORDER — GLIMEPIRIDE 1 MG/1
1 TABLET ORAL
Qty: 90 TABLET | Refills: 1 | Status: SHIPPED | OUTPATIENT
Start: 2025-03-07 | End: 2025-09-03

## 2025-04-03 PROBLEM — I10 BENIGN HYPERTENSION: Status: ACTIVE | Noted: 2025-04-03

## 2025-04-03 PROBLEM — R53.82 CHRONIC FATIGUE: Status: ACTIVE | Noted: 2025-04-03

## 2025-04-03 NOTE — PROGRESS NOTES
Subjective   Patient ID:   Ramsey Hebert is a 74 y.o. male who presents for No chief complaint on file..  HPI  Here with acute symptoms:  Symptoms x  Tiredness.  DUE for labs.    A-fib/HTN/CAD/NSVT:  Seeing cardiology.  BP today is  Taking Amiodarone, Plavix, Lisinopril-hydrochlorothiazide, Metoprolol.  Denies dizziness, headaches.     Bunion:  I referred to podiatry previously.  Right foot.  Symptoms x years.  Bothersome.     HLD:  Taking Atorvastatin.  Last checked April 2024.  DUE for labs.     Diabetes:  New onset in April 2024 with A1C of 7.2.  We stopped Metformin due to SE's last visit.  I sent in Glimepiride as an alternative.  Last A1C was 6.3 in Nov 2024.  POC today is  Checking sugars at home and getting low 100s.     Elevated PSA:  Last checked April 2024 and was improving.  DUE for re-check.     GERD:  Had been on Protonix in the past.  Doing well.     Hand pain:  I gave Voltaren gel previously - this did not help much.  Consider ortho.  Both hands.  Worse in his right hand.  Worse with activity.     Leg cramping:  I recommended magnesium previously.  Consider a muscle relaxer.  Happening off and on.  Both legs.  Only while walking.     Health maintenance:  Smoking: Current smoker.  PSA (50+): April 2024. DUE  Labs: April 2024. DUE  Prevnar 13 (65+): 2013  Pneumovax 23 (65+, 1 year after Prev 13): 2016  Influenza: Nov 2024.  Zostavax (60+, 1 time, at pharmacy):    Review of Systems  12 point review of systems negative unless stated above in HPI    There were no vitals filed for this visit.    Physical Exam  General: Alert and oriented, well nourished, no acute distress.  Lungs: Clear to auscultation, non-labored respiration.  Heart: Normal rate, regular rhythm, no murmur, gallop or edema.  Neurologic: Awake, alert, and oriented X3, CN II-XII intact.  Psychiatric: Cooperative, appropriate mood and affect.    Assessment/Plan   Diagnoses and all orders for this visit:  Medicare annual wellness visit,  subsequent  Depression screening  Chronic fatigue  Atrial fibrillation, unspecified type (Multi)  Type 2 diabetes mellitus without complication, without long-term current use of insulin  -     POCT glycosylated hemoglobin (Hb A1C) manually resulted  Benign hypertension  Pure hypercholesterolemia  Coronary artery disease involving native heart without angina pectoris, unspecified vessel or lesion type  NSVT (nonsustained ventricular tachycardia) (Multi)  Paroxysmal atrial fibrillation (Multi)  Elevated PSA  Obesity, morbid (Multi)  Screening PSA (prostate specific antigen)  Lipid screening

## 2025-04-07 ENCOUNTER — APPOINTMENT (OUTPATIENT)
Dept: PRIMARY CARE | Facility: CLINIC | Age: 75
End: 2025-04-07
Payer: COMMERCIAL

## 2025-04-10 ENCOUNTER — OFFICE VISIT (OUTPATIENT)
Dept: PRIMARY CARE | Facility: CLINIC | Age: 75
End: 2025-04-10
Payer: COMMERCIAL

## 2025-04-10 VITALS
WEIGHT: 272 LBS | HEART RATE: 50 BPM | SYSTOLIC BLOOD PRESSURE: 132 MMHG | OXYGEN SATURATION: 98 % | HEIGHT: 68 IN | BODY MASS INDEX: 41.22 KG/M2 | DIASTOLIC BLOOD PRESSURE: 84 MMHG

## 2025-04-10 DIAGNOSIS — I25.10 CORONARY ARTERY DISEASE INVOLVING NATIVE HEART WITHOUT ANGINA PECTORIS, UNSPECIFIED VESSEL OR LESION TYPE: ICD-10-CM

## 2025-04-10 DIAGNOSIS — I10 BENIGN HYPERTENSION: ICD-10-CM

## 2025-04-10 DIAGNOSIS — I48.91 ATRIAL FIBRILLATION, UNSPECIFIED TYPE (MULTI): ICD-10-CM

## 2025-04-10 DIAGNOSIS — R97.20 ELEVATED PSA: ICD-10-CM

## 2025-04-10 DIAGNOSIS — I47.29 NSVT (NONSUSTAINED VENTRICULAR TACHYCARDIA) (MULTI): ICD-10-CM

## 2025-04-10 DIAGNOSIS — E08.9 DIABETES MELLITUS DUE TO UNDERLYING CONDITION WITHOUT COMPLICATION, UNSPECIFIED WHETHER LONG TERM INSULIN USE: ICD-10-CM

## 2025-04-10 DIAGNOSIS — Z13.31 DEPRESSION SCREENING: ICD-10-CM

## 2025-04-10 DIAGNOSIS — Z00.00 MEDICARE ANNUAL WELLNESS VISIT, SUBSEQUENT: Primary | ICD-10-CM

## 2025-04-10 DIAGNOSIS — E66.01 OBESITY, MORBID (MULTI): ICD-10-CM

## 2025-04-10 DIAGNOSIS — E78.00 PURE HYPERCHOLESTEROLEMIA: ICD-10-CM

## 2025-04-10 DIAGNOSIS — R79.9 ABNORMAL FINDING OF BLOOD CHEMISTRY, UNSPECIFIED: ICD-10-CM

## 2025-04-10 DIAGNOSIS — E11.9 TYPE 2 DIABETES MELLITUS WITHOUT COMPLICATION, WITHOUT LONG-TERM CURRENT USE OF INSULIN: ICD-10-CM

## 2025-04-10 DIAGNOSIS — I48.0 PAROXYSMAL ATRIAL FIBRILLATION (MULTI): ICD-10-CM

## 2025-04-10 DIAGNOSIS — E66.813 CLASS 3 SEVERE OBESITY WITH SERIOUS COMORBIDITY AND BODY MASS INDEX (BMI) OF 40.0 TO 44.9 IN ADULT, UNSPECIFIED OBESITY TYPE: ICD-10-CM

## 2025-04-10 DIAGNOSIS — E66.01 CLASS 3 SEVERE OBESITY WITH SERIOUS COMORBIDITY AND BODY MASS INDEX (BMI) OF 40.0 TO 44.9 IN ADULT, UNSPECIFIED OBESITY TYPE: ICD-10-CM

## 2025-04-10 DIAGNOSIS — R53.82 CHRONIC FATIGUE: ICD-10-CM

## 2025-04-10 DIAGNOSIS — Z13.220 LIPID SCREENING: ICD-10-CM

## 2025-04-10 DIAGNOSIS — Z12.5 SCREENING PSA (PROSTATE SPECIFIC ANTIGEN): ICD-10-CM

## 2025-04-10 DIAGNOSIS — Z00.00 ROUTINE GENERAL MEDICAL EXAMINATION AT HEALTH CARE FACILITY: ICD-10-CM

## 2025-04-10 LAB — POC HEMOGLOBIN A1C: 6.9 % (ref 4.2–6.5)

## 2025-04-10 PROCEDURE — 99215 OFFICE O/P EST HI 40 MIN: CPT | Performed by: PHYSICIAN ASSISTANT

## 2025-04-10 PROCEDURE — 99397 PER PM REEVAL EST PAT 65+ YR: CPT | Performed by: PHYSICIAN ASSISTANT

## 2025-04-10 PROCEDURE — 99214 OFFICE O/P EST MOD 30 MIN: CPT | Mod: 25 | Performed by: PHYSICIAN ASSISTANT

## 2025-04-10 PROCEDURE — 83036 HEMOGLOBIN GLYCOSYLATED A1C: CPT | Mod: QW | Performed by: PHYSICIAN ASSISTANT

## 2025-04-10 RX ORDER — GLIMEPIRIDE 2 MG/1
2 TABLET ORAL
Qty: 90 TABLET | Refills: 1 | Status: SHIPPED | OUTPATIENT
Start: 2025-04-10 | End: 2025-10-07

## 2025-04-10 ASSESSMENT — PAIN SCALES - GENERAL: PAINLEVEL_OUTOF10: 0-NO PAIN

## 2025-04-10 ASSESSMENT — ENCOUNTER SYMPTOMS
DEPRESSION: 0
LOSS OF SENSATION IN FEET: 0
OCCASIONAL FEELINGS OF UNSTEADINESS: 0

## 2025-04-10 ASSESSMENT — ACTIVITIES OF DAILY LIVING (ADL)
DRESSING: INDEPENDENT
MANAGING_FINANCES: INDEPENDENT
TAKING_MEDICATION: INDEPENDENT
BATHING: INDEPENDENT
GROCERY_SHOPPING: INDEPENDENT
DOING_HOUSEWORK: INDEPENDENT

## 2025-04-10 NOTE — ASSESSMENT & PLAN NOTE
Orders:    TSH with reflex to Free T4 if abnormal; Future    Vitamin B12; Future    Vitamin D 25-Hydroxy,Total (for eval of Vitamin D levels); Future    Iron and TIBC; Future    Magnesium; Future    Ferritin; Future    Testosterone; Future

## 2025-04-10 NOTE — ASSESSMENT & PLAN NOTE
Orders:    POCT glycosylated hemoglobin (Hb A1C) manually resulted    Comprehensive Metabolic Panel; Future    CBC and Auto Differential; Future    Albumin-Creatinine Ratio, Urine Random; Future    glimepiride (Amaryl) 2 mg tablet; Take 1 tablet (2 mg) by mouth once daily in the morning. Take before meals.

## 2025-04-10 NOTE — PROGRESS NOTES
"Subjective   Reason for Visit: Ramsey Hebert is an 74 y.o. male here for a Medicare Wellness visit.     Past Medical, Surgical, and Family History reviewed and updated in chart.    Reviewed all medications by prescribing practitioner or clinical pharmacist (such as prescriptions, OTCs, herbal therapies and supplements) and documented in the medical record.    HPI  Patient Care Team:  Pradip Alvarado PA-C as PCP - General (Internal Medicine)     Here with acute symptoms:  Symptoms x 1-2 years.  Tiredness.  Increased fatigue.  DUE for labs.    A-fib/HTN/CAD/NSVT:  Seeing cardiology.  BP today is 132/84.  Taking Amiodarone, Plavix, Lisinopril-hydrochlorothiazide, Metoprolol.  Denies dizziness, headaches.     Bunion:  I referred to podiatry previously.  Right foot.  Symptoms x years.  Bothersome.     HLD:  Taking Atorvastatin.  Last checked April 2024.  DUE for labs.     Diabetes:  New onset in April 2024 with A1C of 7.2.  We stopped Metformin due to SE's last visit.  I sent in Glimepiride as an alternative.  Last A1C was 6.3 in Nov 2024.  POC today is 6.9.  Checking sugars at home and getting low 100s.     Elevated PSA:  Last checked April 2024 and was improving.  DUE for re-check.     GERD:  Had been on Protonix in the past.  Doing well.     Hand pain:  I gave Voltaren gel previously - this did not help much.  Consider ortho.  Both hands.  Worse in his right hand.  Worse with activity.     Leg cramping:  I recommended magnesium previously.  Consider a muscle relaxer.  Happening off and on.  Both legs.  Only while walking.     Health maintenance:  Smoking: Current smoker.  PSA (50+): April 2024. DUE  Labs: April 2024. DUE  Prevnar 13 (65+): 2013  Pneumovax 23 (65+, 1 year after Prev 13): 2016  Influenza: Nov 2024.  Zostavax (60+, 1 time, at pharmacy):    Review of Systems  12 point review of systems negative unless stated above in HPI    Objective   Vitals:  /84   Pulse 50   Ht 1.721 m (5' 7.75\")   Wt 123 kg " (272 lb)   SpO2 98%   BMI 41.66 kg/m²       Physical Exam  General: Alert and oriented, well nourished, no acute distress.  Lungs: Clear to auscultation, non-labored respiration.  Heart: Normal rate, regular rhythm, no murmur, gallop or edema.  Neurologic: Awake, alert, and oriented X3, CN II-XII intact.  Psychiatric: Cooperative, appropriate mood and affect.  Assessment & Plan  Medicare annual wellness visit, subsequent  It was good seeing you!  This counts as your annual Medicare Wellness visit.  Health maintenance was reviewed today.  Depression screening is negative (5 -15 min screening was completed).  I have ordered some labs to be done as soon as you can.  We will call you with the results.  A1C is going the wrong way.  Let's increase the Glimepiride to 2mg daily.  Continue the same medications.  Chronic conditions are stable.  Call with questions or concerns.    Follow up  3-4 months for A1C       Depression screening         Chronic fatigue    Orders:    TSH with reflex to Free T4 if abnormal; Future    Vitamin B12; Future    Vitamin D 25-Hydroxy,Total (for eval of Vitamin D levels); Future    Iron and TIBC; Future    Magnesium; Future    Ferritin; Future    Testosterone; Future    Atrial fibrillation, unspecified type (Multi)         Type 2 diabetes mellitus without complication, without long-term current use of insulin    Orders:    POCT glycosylated hemoglobin (Hb A1C) manually resulted    Comprehensive Metabolic Panel; Future    CBC and Auto Differential; Future    Albumin-Creatinine Ratio, Urine Random; Future    glimepiride (Amaryl) 2 mg tablet; Take 1 tablet (2 mg) by mouth once daily in the morning. Take before meals.    Benign hypertension         Pure hypercholesterolemia         Coronary artery disease involving native heart without angina pectoris, unspecified vessel or lesion type         NSVT (nonsustained ventricular tachycardia) (Multi)         Paroxysmal atrial fibrillation (Multi)          Elevated PSA         Obesity, morbid (Multi)         Screening PSA (prostate specific antigen)    Orders:    Prostate Specific Antigen, Screen; Future    Lipid screening    Orders:    Lipid Panel; Future    Routine general medical examination at health care facility    Orders:    1 Year Follow Up In Primary Care - Wellness Exam; Future    BMI 40.0-44.9, adult (Multi)         Class 3 severe obesity with serious comorbidity and body mass index (BMI) of 40.0 to 44.9 in adult, unspecified obesity type         Body mass index (BMI) 40.0-44.9, adult (Multi)    Orders:    Vitamin D 25-Hydroxy,Total (for eval of Vitamin D levels); Future    Diabetes mellitus due to underlying condition without complication, unspecified whether long term insulin use    Orders:    Iron and TIBC; Future    Abnormal finding of blood chemistry, unspecified    Orders:    Ferritin; Future

## 2025-04-12 LAB
25(OH)D3+25(OH)D2 SERPL-MCNC: 28 NG/ML (ref 30–100)
ALBUMIN SERPL-MCNC: 4.1 G/DL (ref 3.6–5.1)
ALP SERPL-CCNC: 77 U/L (ref 35–144)
ALT SERPL-CCNC: 27 U/L (ref 9–46)
ANION GAP SERPL CALCULATED.4IONS-SCNC: 8 MMOL/L (CALC) (ref 7–17)
AST SERPL-CCNC: 19 U/L (ref 10–35)
BASOPHILS # BLD AUTO: 40 CELLS/UL (ref 0–200)
BASOPHILS NFR BLD AUTO: 0.5 %
BILIRUB SERPL-MCNC: 1 MG/DL (ref 0.2–1.2)
BUN SERPL-MCNC: 17 MG/DL (ref 7–25)
CALCIUM SERPL-MCNC: 9.1 MG/DL (ref 8.6–10.3)
CHLORIDE SERPL-SCNC: 104 MMOL/L (ref 98–110)
CHOLEST SERPL-MCNC: 131 MG/DL
CHOLEST/HDLC SERPL: 5 (CALC)
CO2 SERPL-SCNC: 26 MMOL/L (ref 20–32)
CREAT SERPL-MCNC: 1.41 MG/DL (ref 0.7–1.28)
EGFRCR SERPLBLD CKD-EPI 2021: 52 ML/MIN/1.73M2
EOSINOPHIL # BLD AUTO: 240 CELLS/UL (ref 15–500)
EOSINOPHIL NFR BLD AUTO: 3 %
ERYTHROCYTE [DISTWIDTH] IN BLOOD BY AUTOMATED COUNT: 13.8 % (ref 11–15)
FERRITIN SERPL-MCNC: 210 NG/ML (ref 24–380)
GLUCOSE SERPL-MCNC: 117 MG/DL (ref 65–99)
HCT VFR BLD AUTO: 44.5 % (ref 38.5–50)
HDLC SERPL-MCNC: 26 MG/DL
HGB BLD-MCNC: 15.1 G/DL (ref 13.2–17.1)
IRON SATN MFR SERPL: 36 % (CALC) (ref 20–48)
IRON SERPL-MCNC: 92 MCG/DL (ref 50–180)
LDLC SERPL CALC-MCNC: 73 MG/DL (CALC)
LYMPHOCYTES # BLD AUTO: 2440 CELLS/UL (ref 850–3900)
LYMPHOCYTES NFR BLD AUTO: 30.5 %
MAGNESIUM SERPL-MCNC: 2.3 MG/DL (ref 1.5–2.5)
MCH RBC QN AUTO: 31.3 PG (ref 27–33)
MCHC RBC AUTO-ENTMCNC: 33.9 G/DL (ref 32–36)
MCV RBC AUTO: 92.1 FL (ref 80–100)
MONOCYTES # BLD AUTO: 552 CELLS/UL (ref 200–950)
MONOCYTES NFR BLD AUTO: 6.9 %
NEUTROPHILS # BLD AUTO: 4728 CELLS/UL (ref 1500–7800)
NEUTROPHILS NFR BLD AUTO: 59.1 %
NONHDLC SERPL-MCNC: 105 MG/DL (CALC)
PLATELET # BLD AUTO: 196 THOUSAND/UL (ref 140–400)
PMV BLD REES-ECKER: 10 FL (ref 7.5–12.5)
POTASSIUM SERPL-SCNC: 4.6 MMOL/L (ref 3.5–5.3)
PROT SERPL-MCNC: 7 G/DL (ref 6.1–8.1)
PSA SERPL-MCNC: 3.58 NG/ML
RBC # BLD AUTO: 4.83 MILLION/UL (ref 4.2–5.8)
SODIUM SERPL-SCNC: 138 MMOL/L (ref 135–146)
TESTOST SERPL-MCNC: 336 NG/DL (ref 250–827)
TIBC SERPL-MCNC: 258 MCG/DL (CALC) (ref 250–425)
TRIGL SERPL-MCNC: 231 MG/DL
TSH SERPL-ACNC: 1.33 MIU/L (ref 0.4–4.5)
VIT B12 SERPL-MCNC: 407 PG/ML (ref 200–1100)
WBC # BLD AUTO: 8 THOUSAND/UL (ref 3.8–10.8)

## 2025-05-15 DIAGNOSIS — I10 ESSENTIAL (PRIMARY) HYPERTENSION: ICD-10-CM

## 2025-05-20 RX ORDER — LISINOPRIL AND HYDROCHLOROTHIAZIDE 10; 12.5 MG/1; MG/1
1 TABLET ORAL DAILY
Qty: 90 TABLET | Refills: 2 | OUTPATIENT
Start: 2025-05-20

## 2025-06-10 ENCOUNTER — TELEPHONE (OUTPATIENT)
Facility: CLINIC | Age: 75
End: 2025-06-10
Payer: COMMERCIAL

## 2025-06-10 NOTE — TELEPHONE ENCOUNTER
Tried calling patient, left message on voicemail. We cannot refill his Lisinopril-HTZ since he has not been in the office in over 3 years. Will suggest he reach out to his primary as he was just seen by him in April.

## 2025-06-17 ENCOUNTER — TELEPHONE (OUTPATIENT)
Dept: OPHTHALMOLOGY | Facility: CLINIC | Age: 75
End: 2025-06-17
Payer: COMMERCIAL

## 2025-06-19 DIAGNOSIS — I48.0 PAROXYSMAL ATRIAL FIBRILLATION (MULTI): ICD-10-CM

## 2025-06-19 DIAGNOSIS — I10 ESSENTIAL (PRIMARY) HYPERTENSION: ICD-10-CM

## 2025-06-20 DIAGNOSIS — I10 ESSENTIAL (PRIMARY) HYPERTENSION: ICD-10-CM

## 2025-06-20 RX ORDER — LISINOPRIL AND HYDROCHLOROTHIAZIDE 10; 12.5 MG/1; MG/1
1 TABLET ORAL DAILY
Qty: 90 TABLET | Refills: 3 | Status: SHIPPED | OUTPATIENT
Start: 2025-06-20

## 2025-06-23 RX ORDER — LISINOPRIL AND HYDROCHLOROTHIAZIDE 10; 12.5 MG/1; MG/1
1 TABLET ORAL DAILY
Qty: 90 TABLET | Refills: 2 | OUTPATIENT
Start: 2025-06-23

## 2025-06-24 RX ORDER — METOPROLOL TARTRATE 25 MG/1
25 TABLET, FILM COATED ORAL 2 TIMES DAILY
Qty: 180 TABLET | Refills: 3 | Status: SHIPPED | OUTPATIENT
Start: 2025-06-24

## 2025-07-14 NOTE — PROGRESS NOTES
Subjective   Patient ID:   Ramsey Hebert is a 74 y.o. male who presents for Follow-up.  HPI  Here with acute symptoms:  Symptoms x 1-2 years.  Tiredness.  Increased fatigue.  Labs were overall good in April 2025.  Consider a sleep study.     A-fib/HTN/CAD/NSVT:  Seeing cardiology.  BP today is 116/64.  Taking Amiodarone, Plavix, Lisinopril-hydrochlorothiazide, Metoprolol.  Denies dizziness, headaches.     Bunion:  I referred to podiatry previously.  Right foot.  Symptoms x years.  Bothersome.     HLD:  Taking Atorvastatin.  Last checked April 2025.     Diabetes:  New onset in April 2024 with A1C of 7.2.  Has tried and failed Metformin.  I increased Glimepiride last visit.  Last A1C was 6.9 in April 2025.  POC today is 6.8.  Checking sugars at home and getting low 100s.     Elevated PSA:  Last checked April 2025 and was improving.     GERD:  Had been on Protonix in the past.  Doing well.     Hand pain:  I gave Voltaren gel previously - this did not help much.  Consider ortho.  Both hands.  Worse in his right hand.  Worse with activity.     Leg cramping:  I recommended magnesium previously.  Consider a muscle relaxer.  Happening off and on.  Both legs.  Only while walking.    EDEL:  Using a CPAP.  Needing a referral to sleep medicine.     Health maintenance:  Smoking: Current smoker.  PSA (50+): April 2025  Labs: April 2025  Prevnar 13 (65+): 2013  Pneumovax 23 (65+, 1 year after Prev 13): 2016  Influenza: Nov 2024.  Zostavax (60+, 1 time, at pharmacy):    Review of Systems  12 point review of systems negative unless stated above in HPI    Vitals:    07/21/25 1121   BP: 116/64   Pulse: (!) 44   SpO2: 98%     Physical Exam  General: Alert and oriented, well nourished, no acute distress.  Lungs: Clear to auscultation, non-labored respiration.  Heart: Normal rate, regular rhythm, no murmur, gallop or edema.  Neurologic: Awake, alert, and oriented X3, CN II-XII intact.  Psychiatric: Cooperative, appropriate mood and  affect.    Assessment/Plan   It was good seeing you!  A1C appears stable.  I have placed a referral to sleep medicine for further management.  Let's try Robaxin for the leg cramping.  Consider vascular workup if this does not help.  Continue specialty care.  I have ordered a urine test to be done as soon as you can.  We will call you with the results.  Continue the same medications.  Chronic conditions are stable.  Call with questions or concerns.    Follow up  4 months for A1C  Diagnoses and all orders for this visit:  Type 2 diabetes mellitus without complication, without long-term current use of insulin  -     POCT glycosylated hemoglobin (Hb A1C) manually resulted  -     Albumin-Creatinine Ratio, Urine Random; Future  Atrial fibrillation, unspecified type (Multi)  Chronic fatigue  Benign hypertension  Pure hypercholesterolemia  Coronary artery disease involving native heart without angina pectoris, unspecified vessel or lesion type  Paroxysmal atrial fibrillation (Multi)  NSVT (nonsustained ventricular tachycardia) (Multi)  Elevated PSA  Routine general medical examination at health care facility  -     1 Year Follow Up In Primary Care - Wellness Exam  BMI 40.0-44.9, adult (Multi)  Class 3 severe obesity with serious comorbidity and body mass index (BMI) of 40.0 to 44.9 in adult, unspecified obesity type  Bradycardia  Leg cramping  -     methocarbamol (Robaxin) 750 mg tablet; Take 1 tablet (750 mg) by mouth 3 times a day.  EDEL (obstructive sleep apnea)  -     Referral to Adult Sleep Medicine; Future

## 2025-07-21 ENCOUNTER — OFFICE VISIT (OUTPATIENT)
Dept: PRIMARY CARE | Facility: CLINIC | Age: 75
End: 2025-07-21
Payer: COMMERCIAL

## 2025-07-21 VITALS
HEIGHT: 68 IN | BODY MASS INDEX: 41.68 KG/M2 | WEIGHT: 275 LBS | DIASTOLIC BLOOD PRESSURE: 64 MMHG | HEART RATE: 44 BPM | OXYGEN SATURATION: 98 % | SYSTOLIC BLOOD PRESSURE: 116 MMHG

## 2025-07-21 DIAGNOSIS — I25.10 CORONARY ARTERY DISEASE INVOLVING NATIVE HEART WITHOUT ANGINA PECTORIS, UNSPECIFIED VESSEL OR LESION TYPE: ICD-10-CM

## 2025-07-21 DIAGNOSIS — R97.20 ELEVATED PSA: ICD-10-CM

## 2025-07-21 DIAGNOSIS — R00.1 BRADYCARDIA: ICD-10-CM

## 2025-07-21 DIAGNOSIS — R53.82 CHRONIC FATIGUE: ICD-10-CM

## 2025-07-21 DIAGNOSIS — I48.91 ATRIAL FIBRILLATION, UNSPECIFIED TYPE (MULTI): ICD-10-CM

## 2025-07-21 DIAGNOSIS — Z00.00 ROUTINE GENERAL MEDICAL EXAMINATION AT HEALTH CARE FACILITY: ICD-10-CM

## 2025-07-21 DIAGNOSIS — E78.00 PURE HYPERCHOLESTEROLEMIA: ICD-10-CM

## 2025-07-21 DIAGNOSIS — E66.813 CLASS 3 SEVERE OBESITY WITH SERIOUS COMORBIDITY AND BODY MASS INDEX (BMI) OF 40.0 TO 44.9 IN ADULT, UNSPECIFIED OBESITY TYPE: ICD-10-CM

## 2025-07-21 DIAGNOSIS — I48.0 PAROXYSMAL ATRIAL FIBRILLATION (MULTI): ICD-10-CM

## 2025-07-21 DIAGNOSIS — G47.33 OSA (OBSTRUCTIVE SLEEP APNEA): ICD-10-CM

## 2025-07-21 DIAGNOSIS — E11.9 TYPE 2 DIABETES MELLITUS WITHOUT COMPLICATION, WITHOUT LONG-TERM CURRENT USE OF INSULIN: Primary | ICD-10-CM

## 2025-07-21 DIAGNOSIS — I10 BENIGN HYPERTENSION: ICD-10-CM

## 2025-07-21 DIAGNOSIS — I47.29 NSVT (NONSUSTAINED VENTRICULAR TACHYCARDIA) (MULTI): ICD-10-CM

## 2025-07-21 DIAGNOSIS — R25.2 LEG CRAMPING: ICD-10-CM

## 2025-07-21 LAB — POC HEMOGLOBIN A1C: 6.8 % (ref 4.2–6.5)

## 2025-07-21 PROCEDURE — 99214 OFFICE O/P EST MOD 30 MIN: CPT | Performed by: PHYSICIAN ASSISTANT

## 2025-07-21 PROCEDURE — G2211 COMPLEX E/M VISIT ADD ON: HCPCS | Performed by: PHYSICIAN ASSISTANT

## 2025-07-21 PROCEDURE — 3074F SYST BP LT 130 MM HG: CPT | Performed by: PHYSICIAN ASSISTANT

## 2025-07-21 PROCEDURE — 1159F MED LIST DOCD IN RCRD: CPT | Performed by: PHYSICIAN ASSISTANT

## 2025-07-21 PROCEDURE — 1126F AMNT PAIN NOTED NONE PRSNT: CPT | Performed by: PHYSICIAN ASSISTANT

## 2025-07-21 PROCEDURE — 1160F RVW MEDS BY RX/DR IN RCRD: CPT | Performed by: PHYSICIAN ASSISTANT

## 2025-07-21 PROCEDURE — 3044F HG A1C LEVEL LT 7.0%: CPT | Performed by: PHYSICIAN ASSISTANT

## 2025-07-21 PROCEDURE — 3078F DIAST BP <80 MM HG: CPT | Performed by: PHYSICIAN ASSISTANT

## 2025-07-21 PROCEDURE — 83036 HEMOGLOBIN GLYCOSYLATED A1C: CPT | Mod: QW | Performed by: PHYSICIAN ASSISTANT

## 2025-07-21 RX ORDER — METHOCARBAMOL 750 MG/1
750 TABLET, FILM COATED ORAL 3 TIMES DAILY
Qty: 90 TABLET | Refills: 0 | Status: SHIPPED | OUTPATIENT
Start: 2025-07-21 | End: 2025-08-20

## 2025-07-21 ASSESSMENT — PAIN SCALES - GENERAL: PAINLEVEL_OUTOF10: 0-NO PAIN

## 2025-08-07 ENCOUNTER — OFFICE VISIT (OUTPATIENT)
Facility: CLINIC | Age: 75
End: 2025-08-07
Payer: COMMERCIAL

## 2025-08-07 VITALS
WEIGHT: 279 LBS | HEIGHT: 68 IN | RESPIRATION RATE: 16 BRPM | BODY MASS INDEX: 42.28 KG/M2 | SYSTOLIC BLOOD PRESSURE: 136 MMHG | HEART RATE: 55 BPM | DIASTOLIC BLOOD PRESSURE: 80 MMHG | OXYGEN SATURATION: 98 %

## 2025-08-07 DIAGNOSIS — I10 HTN (HYPERTENSION): ICD-10-CM

## 2025-08-07 DIAGNOSIS — Z95.1 S/P CABG X 3: ICD-10-CM

## 2025-08-07 DIAGNOSIS — I48.0 PAROXYSMAL ATRIAL FIBRILLATION (MULTI): ICD-10-CM

## 2025-08-07 DIAGNOSIS — I48.91 ATRIAL FIBRILLATION (MULTI): Primary | ICD-10-CM

## 2025-08-07 PROCEDURE — 1159F MED LIST DOCD IN RCRD: CPT | Performed by: INTERNAL MEDICINE

## 2025-08-07 PROCEDURE — 3079F DIAST BP 80-89 MM HG: CPT | Performed by: INTERNAL MEDICINE

## 2025-08-07 PROCEDURE — 3044F HG A1C LEVEL LT 7.0%: CPT | Performed by: INTERNAL MEDICINE

## 2025-08-07 PROCEDURE — 99214 OFFICE O/P EST MOD 30 MIN: CPT | Performed by: INTERNAL MEDICINE

## 2025-08-07 PROCEDURE — 1126F AMNT PAIN NOTED NONE PRSNT: CPT | Performed by: INTERNAL MEDICINE

## 2025-08-07 PROCEDURE — 3075F SYST BP GE 130 - 139MM HG: CPT | Performed by: INTERNAL MEDICINE

## 2025-08-07 PROCEDURE — 99212 OFFICE O/P EST SF 10 MIN: CPT

## 2025-08-07 PROCEDURE — G2211 COMPLEX E/M VISIT ADD ON: HCPCS | Performed by: INTERNAL MEDICINE

## 2025-08-07 RX ORDER — METOPROLOL SUCCINATE 25 MG/1
25 TABLET, EXTENDED RELEASE ORAL
Qty: 30 TABLET | Refills: 5 | Status: SHIPPED | OUTPATIENT
Start: 2025-08-07 | End: 2026-02-03

## 2025-08-07 ASSESSMENT — LIFESTYLE VARIABLES
AUDIT-C TOTAL SCORE: 0
HOW OFTEN DO YOU HAVE SIX OR MORE DRINKS ON ONE OCCASION: NEVER
HOW OFTEN DO YOU HAVE A DRINK CONTAINING ALCOHOL: NEVER
HAS A RELATIVE, FRIEND, DOCTOR, OR ANOTHER HEALTH PROFESSIONAL EXPRESSED CONCERN ABOUT YOUR DRINKING OR SUGGESTED YOU CUT DOWN: NO
HAVE YOU OR SOMEONE ELSE BEEN INJURED AS A RESULT OF YOUR DRINKING: NO
AUDIT TOTAL SCORE: 0
SKIP TO QUESTIONS 9-10: 1
HOW MANY STANDARD DRINKS CONTAINING ALCOHOL DO YOU HAVE ON A TYPICAL DAY: PATIENT DOES NOT DRINK

## 2025-08-07 ASSESSMENT — ENCOUNTER SYMPTOMS
LOSS OF SENSATION IN FEET: 0
OCCASIONAL FEELINGS OF UNSTEADINESS: 0
DEPRESSION: 0

## 2025-08-07 ASSESSMENT — PAIN SCALES - GENERAL: PAINLEVEL_OUTOF10: 0-NO PAIN

## 2025-08-07 NOTE — PROGRESS NOTES
[facility] [place]        Subjective   No chief complaint on file.     For follow-up visit, last seen in 2022 in our office.  He had three-vessel CABG by Dr. Callahan in October 2020 complicated by recurrent ventricular tachycardia postoperatively from an ostial circumflex branch stenosis with the circumflex not amenable to CABG so he was transferred to [facility] and had circumflex ostial stenting by Dr. Bahena    He remains angina free with no signs of heart failure    He has no past medical history on file.  He has no past surgical history on file.   Family medical history includes stroke in father.  Current Outpatient Medications   Medication Sig Dispense Refill    aspirin 81 mg EC tablet Take 1 tablet (81 mg) by mouth once daily.      atorvastatin (Lipitor) 80 mg tablet Take 1 tablet (80 mg) by mouth once daily. 90 tablet 2    cholecalciferol (Vitamin D-3) 25 MCG (1000 UT) tablet Take 1 tablet (25 mcg) by mouth once daily.      glimepiride (Amaryl) 2 mg tablet Take 1 tablet (2 mg) by mouth once daily in the morning. Take before meals. 90 tablet 1    lisinopriL-hydrochlorothiazide 10-12.5 mg tablet Take 1 tablet by mouth once daily. 90 tablet 3    methocarbamol (Robaxin) 750 mg tablet Take 1 tablet (750 mg) by mouth 3 times a day. 90 tablet 0    metoprolol tartrate (Lopressor) 25 mg tablet TAKE 1 TABLET BY MOUTH TWICE A  tablet 3     No current facility-administered medications for this visit.      reports that he has quit smoking. His smoking use included cigarettes. He has never been exposed to tobacco smoke. He has never used smokeless tobacco. He reports that he does not currently use alcohol. He reports that he does not use drugs.  Allergies:  Tetanus vaccines and toxoid    ROS: See HPI  CONSTITUTIONAL: Chills- none. Fever- none. Weight change appropriate for age.  HEENT: Headache- Negative.  Change in vision- none.  Ear pain- none. Nasal congestion- none.  Post-nasal drip-none.  Sore throat-none.  CARDIOLOGY: Chest pain- none.  Leg edema-trace.  Murmurs-soft systolic.  Palpitation- none.  RESPIRATORY: Denies any shortness of breath.  GI: Abdominal pain- none.  Change in bowel habits- none.  Constipation- none.  Diarrhea- none.  Nausea- none.  Vomiting- none.  MUSCULOSKELETAL: Joint pain- none.  Muscle aches- none.  DERMATOLOGY: Rash- none.  NEUROLOGY: Dizziness- none.   Headache- none.  PSYCHIATRY: Denies any depression or anxiety     There were no vitals filed for this visit.   BMI:There is no height or weight on file to calculate BMI.   General Cardiology:  General Appearance: Alert, oriented and in no acute distress.  HEENT: extra ocular movements intact (EOMI), pupils equal,  round, reactive to light and accommodation (PERRLA).  Carotid Upstroke: no bruit, normal.  Jugular Venous Distention (JVD): flat.  Chest: normal.  Lungs: Clear to auscultation,   Heart Sounds: no S3 or S4, normal S1, S2, regular rate.  Abdomen: no hepatomegaly, no masses felt, soft.  Extremities: no leg edema.  Peripheral pulses: 2 plus bilateral.  NEUROLOGY Cranial nerves II-XII grossly intact.     Last Labs:  CMP:  Recent Labs     04/11/25  0936 04/04/24  0940 04/12/22  0834    141 139   K 4.6 4.4 4.2    105 105   CO2 26 22* 25   ANIONGAP 8 14 9   BUN 17 26* 21   CREATININE 1.41* 1.50 1.4   EGFR 52* 49* 53   GLUCOSE 117* 142* 110*     Recent Labs     04/11/25  0936 04/04/24  0940 04/12/22  0834   ALBUMIN 4.1 3.9 4.0   ALKPHOS 77 93 85   ALT 27 20 27   AST 19 15 21   BILITOT 1.0 0.7 0.7     CBC:  Recent Labs     04/11/25  0936 04/04/24  0940 04/12/22  0834   WBC 8.0 8.4 6.8   HGB 15.1 14.2 14.3   HCT 44.5 44.2 44.6    188 181   MCV 92.1 96 94.9     COAG:   Recent Labs     02/15/21  1017 02/08/21  1012   INR 4.6* 1.3*     HEME/ENDO:  Recent Labs     07/21/25  1126 04/11/25  0936 04/10/25  1033 11/13/24  0905 04/04/24  0940 04/12/22  0834 09/21/20  0451   FERRITIN  --  210   --   --   --   --   --    IRONSAT  --  36  --   --   --   --   --    TSH  --  1.33  --   --   --  0.65 1.14   HGBA1C 6.8*  --  6.9* 6.3   < > 6.4* 5.8    < > = values in this interval not displayed.      CARDIAC:   Recent Labs     10/14/20  0345 10/13/20  1647   CKMB 157.2* 41.7*     Recent Labs     04/11/25  0936 04/04/24  0940 04/12/22  0834   CHOL 131 127* 133   LDLCALC 73 61* 61*   HDL 26* 22.0* 26*   TRIG 231* 218* 228*       Last Cardiology Tests:  Echo:  Echo Results:  No results found for this or any previous visit from the past 3650 days.     Cath:  Stress Test:  Stress Results:  No results found for this or any previous visit from the past 365 days.     Cardiac Imaging:    Problem List Items Addressed This Visit       Atrial fibrillation (Multi) - Primary    At the time of his previous CABG in 2022 he had maze procedure and left atrial appendage ligation and it was thought that he did not require  chronic anticoagulation in the setting         S/P CABG x 3    Continue on aspirin therapy, chronic statin therapy           Follow-up with me in 1 year    view of diagnostic tests, labs, radiographs, EKGs, old echoes, cardiac work-up and coordination of care. Assessment, impression and plans are reflected in the note above as well as the orders.    Moris Kuhn,   [facility] [place]  [facility]

## 2025-08-07 NOTE — ASSESSMENT & PLAN NOTE
He has been more fatigued these days but no chest pain.  He notices after he takes his morning beta-blocker therapy that his functional capacity has decreased some I am going to change his beta-blocker therapy to metoprolol succinate 25 mg each evening with dinner.  Continue his regular exercise and follow-up with me in a year

## 2025-08-07 NOTE — ASSESSMENT & PLAN NOTE
At the time of his previous CABG in 2022 he had maze procedure and left atrial appendage ligation and it was thought that he did not require  chronic anticoagulation in the setting

## 2025-08-13 DIAGNOSIS — E11.9 TYPE 2 DIABETES MELLITUS WITHOUT COMPLICATION, WITHOUT LONG-TERM CURRENT USE OF INSULIN: ICD-10-CM

## 2025-08-13 RX ORDER — GLIMEPIRIDE 2 MG/1
2 TABLET ORAL
Qty: 90 TABLET | Refills: 1 | Status: SHIPPED | OUTPATIENT
Start: 2025-08-13 | End: 2026-02-09

## 2025-08-27 ENCOUNTER — OFFICE VISIT (OUTPATIENT)
Dept: OPHTHALMOLOGY | Facility: CLINIC | Age: 75
End: 2025-08-27
Payer: COMMERCIAL

## 2025-08-27 DIAGNOSIS — H40.052 OCULAR HYPERTENSION OF LEFT EYE: Primary | ICD-10-CM

## 2025-08-27 PROCEDURE — 92020 GONIOSCOPY: CPT | Performed by: OPHTHALMOLOGY

## 2025-08-27 PROCEDURE — 99213 OFFICE O/P EST LOW 20 MIN: CPT | Performed by: OPHTHALMOLOGY

## 2025-08-27 RX ORDER — TIMOLOL MALEATE 5 MG/ML
1 SOLUTION/ DROPS OPHTHALMIC 2 TIMES DAILY
COMMUNITY
Start: 2025-08-22

## 2025-08-27 ASSESSMENT — TONOMETRY
OS_IOP_MMHG: 28
IOP_METHOD: GOLDMANN APPLANATION
OS_IOP_MMHG: 35
OD_IOP_MMHG: 16
IOP_METHOD: TONOPEN

## 2025-08-27 ASSESSMENT — VISUAL ACUITY
OD_CC: 20/25
OS_CC: 20/200
OD_CC+: +2
METHOD: SNELLEN - LINEAR

## 2025-08-27 ASSESSMENT — ENCOUNTER SYMPTOMS
EYES NEGATIVE: 0
CONSTITUTIONAL NEGATIVE: 0
MUSCULOSKELETAL NEGATIVE: 0
RESPIRATORY NEGATIVE: 0
ENDOCRINE NEGATIVE: 0
NEUROLOGICAL NEGATIVE: 0
ALLERGIC/IMMUNOLOGIC NEGATIVE: 0
GASTROINTESTINAL NEGATIVE: 0
PSYCHIATRIC NEGATIVE: 0
HEMATOLOGIC/LYMPHATIC NEGATIVE: 0
CARDIOVASCULAR NEGATIVE: 0

## 2025-08-27 ASSESSMENT — REFRACTION_WEARINGRX
OD_CYLINDER: -1.00
SPECS_TYPE: PROGRESSIVE
OS_AXIS: 093
OD_ADD: 2.25
OD_AXIS: 99
OS_ADD: 2.25
OD_SPHERE: +0.75
OS_CYLINDER: -1.50
OS_SPHERE: +1.00

## 2025-08-27 ASSESSMENT — PATIENT HEALTH QUESTIONNAIRE - PHQ9
1. LITTLE INTEREST OR PLEASURE IN DOING THINGS: NOT AT ALL
2. FEELING DOWN, DEPRESSED OR HOPELESS: NOT AT ALL
SUM OF ALL RESPONSES TO PHQ9 QUESTIONS 1 AND 2: 0

## 2025-08-27 ASSESSMENT — GONIOSCOPY
OD_INFERIOR: CBB
OS_SUPERIOR: CBB
OD_NASAL: SS
OS_NASAL: SS
OS_TEMPORAL: CBB
OD_TEMPORAL: CBB
OD_SUPERIOR: CBB
OS_INFERIOR: CBB

## 2025-08-27 ASSESSMENT — SLIT LAMP EXAM - LIDS
COMMENTS: NORMAL
COMMENTS: NORMAL

## 2025-08-27 ASSESSMENT — PAIN SCALES - GENERAL: PAINLEVEL_OUTOF10: 0-NO PAIN

## 2025-08-27 ASSESSMENT — EXTERNAL EXAM - RIGHT EYE: OD_EXAM: NORMAL

## 2025-08-27 ASSESSMENT — EXTERNAL EXAM - LEFT EYE: OS_EXAM: NORMAL

## 2025-09-02 ENCOUNTER — APPOINTMENT (OUTPATIENT)
Dept: OPHTHALMOLOGY | Facility: CLINIC | Age: 75
End: 2025-09-02
Payer: COMMERCIAL

## 2025-09-02 ASSESSMENT — ENCOUNTER SYMPTOMS
HEMATOLOGIC/LYMPHATIC NEGATIVE: 0
PSYCHIATRIC NEGATIVE: 0
MUSCULOSKELETAL NEGATIVE: 0
GASTROINTESTINAL NEGATIVE: 0
NEUROLOGICAL NEGATIVE: 0
RESPIRATORY NEGATIVE: 0
EYES NEGATIVE: 0
ALLERGIC/IMMUNOLOGIC NEGATIVE: 0
ENDOCRINE NEGATIVE: 0
CARDIOVASCULAR NEGATIVE: 0
CONSTITUTIONAL NEGATIVE: 0

## 2025-09-02 ASSESSMENT — REFRACTION_WEARINGRX
OD_ADD: +2.50
OS_AXIS: 102
OS_ADD: +1.75
OS_SPHERE: +2.00
OS_CYLINDER: -1.50
OD_SPHERE: +0.50
OD_CYLINDER: +1.25
OD_AXIS: 091

## 2025-09-02 ASSESSMENT — VISUAL ACUITY
OD_CC: 20/25
OD_CC+: -2
OS_CC: 20/200
METHOD: SNELLEN - LINEAR

## 2025-09-02 ASSESSMENT — PATIENT HEALTH QUESTIONNAIRE - PHQ9
SUM OF ALL RESPONSES TO PHQ9 QUESTIONS 1 AND 2: 0
1. LITTLE INTEREST OR PLEASURE IN DOING THINGS: NOT AT ALL
SUM OF ALL RESPONSES TO PHQ9 QUESTIONS 1 AND 2: 0
1. LITTLE INTEREST OR PLEASURE IN DOING THINGS: NOT AT ALL
2. FEELING DOWN, DEPRESSED OR HOPELESS: NOT AT ALL
2. FEELING DOWN, DEPRESSED OR HOPELESS: NOT AT ALL

## 2025-09-02 ASSESSMENT — TONOMETRY
OS_IOP_MMHG: 29
IOP_METHOD: GOLDMANN APPLANATION

## 2025-09-02 ASSESSMENT — EXTERNAL EXAM - RIGHT EYE: OD_EXAM: NORMAL

## 2025-09-02 ASSESSMENT — SLIT LAMP EXAM - LIDS
COMMENTS: NORMAL
COMMENTS: NORMAL

## 2025-09-02 ASSESSMENT — EXTERNAL EXAM - LEFT EYE: OS_EXAM: NORMAL

## 2025-09-02 ASSESSMENT — PAIN SCALES - GENERAL: PAINLEVEL_OUTOF10: 0-NO PAIN

## 2025-09-25 ENCOUNTER — APPOINTMENT (OUTPATIENT)
Dept: SLEEP MEDICINE | Facility: CLINIC | Age: 75
End: 2025-09-25
Payer: COMMERCIAL